# Patient Record
Sex: FEMALE | Race: WHITE | Employment: FULL TIME | URBAN - METROPOLITAN AREA
[De-identification: names, ages, dates, MRNs, and addresses within clinical notes are randomized per-mention and may not be internally consistent; named-entity substitution may affect disease eponyms.]

---

## 2018-08-29 LAB
CHOLESTEROL, TOTAL: 160 MG/DL
CHOLESTEROL/HDL RATIO: NORMAL
HDLC SERPL-MCNC: 53 MG/DL (ref 35–70)
LDL CHOLESTEROL CALCULATED: 94 MG/DL (ref 0–160)
TRIGL SERPL-MCNC: 66 MG/DL
VLDLC SERPL CALC-MCNC: 13 MG/DL

## 2019-05-01 RX ORDER — PROPRANOLOL HYDROCHLORIDE 80 MG/1
80 CAPSULE, EXTENDED RELEASE ORAL DAILY
COMMUNITY
End: 2019-05-05 | Stop reason: SDUPTHER

## 2019-05-08 ENCOUNTER — OFFICE VISIT (OUTPATIENT)
Dept: FAMILY MEDICINE CLINIC | Age: 54
End: 2019-05-08
Payer: COMMERCIAL

## 2019-05-08 VITALS
DIASTOLIC BLOOD PRESSURE: 68 MMHG | SYSTOLIC BLOOD PRESSURE: 118 MMHG | HEIGHT: 65 IN | WEIGHT: 153.2 LBS | BODY MASS INDEX: 25.52 KG/M2 | HEART RATE: 77 BPM | OXYGEN SATURATION: 97 %

## 2019-05-08 DIAGNOSIS — I34.1 MITRAL VALVE PROLAPSE: Primary | ICD-10-CM

## 2019-05-08 DIAGNOSIS — R00.2 PALPITATIONS: ICD-10-CM

## 2019-05-08 PROBLEM — L03.116 CELLULITIS OF LEFT LEG: Status: ACTIVE | Noted: 2018-01-02

## 2019-05-08 PROBLEM — S81.002A OPEN WOUND OF LEFT KNEE: Status: ACTIVE | Noted: 2018-02-08

## 2019-05-08 PROCEDURE — 99213 OFFICE O/P EST LOW 20 MIN: CPT | Performed by: FAMILY MEDICINE

## 2019-05-08 RX ORDER — PROPRANOLOL HYDROCHLORIDE 80 MG/1
CAPSULE, EXTENDED RELEASE ORAL
Qty: 30 CAPSULE | Refills: 5 | Status: SHIPPED | OUTPATIENT
Start: 2019-05-08 | End: 2019-11-13 | Stop reason: SDUPTHER

## 2019-05-08 ASSESSMENT — PATIENT HEALTH QUESTIONNAIRE - PHQ9
SUM OF ALL RESPONSES TO PHQ QUESTIONS 1-9: 0
SUM OF ALL RESPONSES TO PHQ QUESTIONS 1-9: 0
2. FEELING DOWN, DEPRESSED OR HOPELESS: 0
SUM OF ALL RESPONSES TO PHQ9 QUESTIONS 1 & 2: 0
1. LITTLE INTEREST OR PLEASURE IN DOING THINGS: 0

## 2019-05-08 ASSESSMENT — ENCOUNTER SYMPTOMS
SHORTNESS OF BREATH: 0
ABDOMINAL PAIN: 0
COUGH: 0

## 2019-05-08 NOTE — PROGRESS NOTES
Worry: None     Inability: None    Transportation needs:     Medical: None     Non-medical: None   Tobacco Use    Smoking status: Never Smoker    Smokeless tobacco: Never Used   Substance and Sexual Activity    Alcohol use: None    Drug use: None    Sexual activity: None   Lifestyle    Physical activity:     Days per week: None     Minutes per session: None    Stress: None   Relationships    Social connections:     Talks on phone: None     Gets together: None     Attends Rastafarian service: None     Active member of club or organization: None     Attends meetings of clubs or organizations: None     Relationship status: None    Intimate partner violence:     Fear of current or ex partner: None     Emotionally abused: None     Physically abused: None     Forced sexual activity: None   Other Topics Concern    None   Social History Narrative    None        SURGICAL HISTORY  Past Surgical History:   Procedure Laterality Date    CHOLECYSTECTOMY         CURRENT MEDICATIONS  Current Outpatient Medications   Medication Sig Dispense Refill    propranolol (INDERAL LA) 80 MG extended release capsule Take 1 tablet by mouth daily 30 capsule 5     No current facility-administered medications for this visit. ALLERGIES  No Known Allergies    PHYSICAL EXAM    Physical Exam   Constitutional: She is oriented to person, place, and time. She appears well-developed and well-nourished. No distress. HENT:   Head: Normocephalic and atraumatic. Right Ear: External ear normal.   Left Ear: External ear normal.   Cardiovascular: Normal rate and regular rhythm. Pulmonary/Chest: Effort normal and breath sounds normal.   Pt speaking in full sentences and lungs are CTA bilaterally. Neurological: She is alert and oriented to person, place, and time. Skin: Skin is warm and dry. She is not diaphoretic. Psychiatric: She has a normal mood and affect.  Her behavior is normal. Judgment and thought content normal. ASSESSMENT & PLAN    1. Mitral valve prolapse  Patient to continue taking propranolol 80 mg, 1 tablet by mouth daily. His monitor her symptoms and let us know should she have any other issues. Symptoms otherwise follow-up in 6 months. 2. Palpitations  Patient to continue taking propranolol 80 mg, 1 tablet by mouth daily. His monitor her symptoms and let us know should she have any other issues. Symptoms otherwise follow-up in 6 months. Did discuss with patient the need to either complete a colonoscopy or cold guard as she is past due for these health maintenance screenings. Patient is setting up an appointment with Dr. Wayne Harrison for her yearly well woman exam and will have another mammogram completed late this year, as her last mammogram was completed in October 2018. Patient did state that we were able to contact [place] [phone] for her mammogram results from last year. Patient verbalized understanding of and agreement with current POC for the assessment and plan dictated above. Electronically signed by KALEY Gautam on 5/8/2019      Please note that this chart was generated using dragon dictation software. Although every effort was made to ensure the accuracy of this automated transcription, some errors in transcription may have occurred.

## 2019-05-08 NOTE — PATIENT INSTRUCTIONS
Please keep taking medications as prescribed. Follow up with this office in 6 months. Please have Dr. Becca Gan send us results of your yearly screenings including mammograms and pap smears. Patient Education        propranolol  Pronunciation:  pro PRAJM oh lol  Brand:  Hemangeol, Inderal LA, Inderal XL, InnoPran XL  What is the most important information I should know about propranolol? You should not use this medicine if you have asthma, very slow heart beats, or a serious heart condition such as \"sick sinus syndrome\" or \"AV block\" (unless you have a pacemaker). Babies who weigh less than 4.5 pounds should not be given Hemangeol oral liquid. What is propranolol? Propranolol is a beta-blocker. Beta-blockers affect the heart and circulation (blood flow through arteries and veins). Propranolol is used to treat tremors, angina (chest pain), hypertension (high blood pressure), heart rhythm disorders, and other heart or circulatory conditions. It is also used to treat or prevent heart attack, and to reduce the severity and frequency of migraine headaches. Hemangeol (propranolol oral liquid 4.28 milligrams) is given to infants who are at least 11weeks old to treat a genetic condition called infantile hemangiomas. Hemangiomas are caused by blood vessels grouping together in an abnormal way. These blood vessels form benign (non-cancerous) growths that can develop into ulcers or red marks on the skin. Hemangiomas can also cause more serious complications inside the body (in the liver, brain, or digestive system). Propranolol may also be used for purposes not listed in this medication guide. What should I discuss with my healthcare provider before taking propranolol?   You should not use propranolol if you are allergic to it, or if you have:  · asthma;  · very slow heart beats that have caused you to faint; or  · a serious heart condition such as \"sick sinus syndrome\" or \"AV block\" (unless you have a pacemaker). Babies who weigh less than 4.5 pounds should not be given Hemangeol oral liquid. To make sure propranolol is safe for you, tell your doctor if you have:  · a muscle disorder;  · bronchitis, emphysema, or other breathing disorders;  · low blood sugar, or diabetes (propranolol can make it harder for you to tell when you have low blood sugar);  · slow heartbeats, low blood pressure;  · congestive heart failure;  · depression;  · liver or kidney disease;  · a thyroid disorder;  · pheochromocytoma (tumor of the adrenal gland); or  · problems with circulation (such as Raynaud's syndrome). It is not known whether propranolol will harm an unborn baby. Tell your doctor if you are pregnant or plan to become pregnant while using this medicine. Propranolol can pass into breast milk and may harm a nursing baby. Tell your doctor if you are breast-feeding a baby. How should I take propranolol? Follow all directions on your prescription label. Your doctor may occasionally change your dose to make sure you get the best results. Do not take this medicine in larger or smaller amounts or for longer than recommended. Adults may take propranolol with or without food, but take it the same way each time. Take this medicine at the same time each day. Do not crush, chew, break, or open an extended-release capsule. Swallow it whole. Hemangeol must be given to an infant during or just after a feeding. Doses should be spaced at least 9 hours apart. Make sure your child gets fed regularly while taking this medicine. Tell your doctor when the child has any changes in weight. Hemangeol doses are based on weight in children, and any changes may affect your child's dose. Call your doctor if a child taking Hemangeol is sick with vomiting, or has any loss of appetite. Measure liquid medicine with the dosing syringe provided, or with a special dose-measuring spoon or medicine cup.  If you do not have a dose-measuring device, ask your pharmacist for one. Do not shake Hemangeol liquid. Your blood pressure will need to be checked often. If you need surgery, tell the surgeon ahead of time that you are using propranolol. You may need to stop using the medicine for a short time. Do not skip doses or stop using propranolol suddenly. Stopping suddenly may make your condition worse. Follow your doctor's instructions about tapering your dose. This medicine can cause unusual results with certain medical tests. Tell any doctor who treats you that you are using propranolol. If you are being treated for high blood pressure, keep using this medicine even if you feel well. High blood pressure often has no symptoms. You may need to use blood pressure medicine for the rest of your life. Propranolol is only part of a complete program of treatment for hypertension that may also include diet, exercise, and weight control. Follow your diet, medication, and exercise routines very closely if you are being treated for hypertension. Store at room temperature away from moisture and heat. Do not allow liquid medicine to freeze. Throw away any unused Hemangeol 2 months after you first opened the bottle. What happens if I miss a dose? For regular (short-acting) propranolol: Take the missed dose as soon as you remember. Skip the missed dose if your next dose is less than 4 hours away. For extended-release propranolol (Inderal LA, InnoPran XL and others): Take the missed dose as soon as you remember. Skip the missed dose if your next dose is less than 8 hours away. Do not take extra medicine to make up the missed dose. What happens if I overdose? Seek emergency medical attention or call the Poison Help line at 1-934.580.4620. Overdose symptoms may include slow or uneven heartbeats, dizziness, weakness, or fainting. What should I avoid while taking propranolol? Avoid drinking alcohol. It may increase your blood levels of propranolol.   Avoid getting up too fast from a sitting or lying position, or you may feel dizzy. Get up slowly and steady yourself to prevent a fall. What are the possible side effects of propranolol? Get emergency medical help if you have signs of an allergic reaction: hives; difficult breathing; swelling of your face, lips, tongue, or throat. Call your doctor at once if you have:  · slow or uneven heartbeats;  · a light-headed feeling, like you might pass out;  · wheezing or trouble breathing;  · shortness of breath (even with mild exertion), swelling, rapid weight gain;  · sudden weakness, vision problems, or loss of coordination (especially in a child with hemangioma that affects the face or head);  · cold feeling in your hands and feet;  · depression, confusion, hallucinations;  · liver problems --nausea, upper stomach pain, itching, tired feeling, loss of appetite, dark urine, olivia-colored stools, jaundice (yellowing of the skin or eyes);  · low blood sugar --headache, hunger, weakness, sweating, confusion, irritability, dizziness, fast heart rate, or feeling jittery;  · low blood sugar in a baby --pale skin, blue or purple skin, sweating, fussiness, crying, not wanting to eat, feeling cold, drowsiness, weak or shallow breathing (breathing may stop for short periods), seizure (convulsions), or loss of consciousness; or  · severe skin reaction --fever, sore throat, swelling in your face or tongue, burning in your eyes, skin pain, followed by a red or purple skin rash that spreads (especially in the face or upper body) and causes blistering and peeling. Common side effects may include:  · nausea, vomiting, diarrhea, constipation, stomach cramps;  · decreased sex drive, impotence, or difficulty having an orgasm;  · sleep problems (insomnia); or  · tired feeling. This is not a complete list of side effects and others may occur. Call your doctor for medical advice about side effects. You may report side effects to FDA at 0-512-FDA-5671.   What other drugs will affect propranolol? Tell your doctor about all medicines you use, and those you start or stop using during your treatment with propranolol, especially:  · a blood thinner --warfarin, Coumadin, Jantoven;  · an antidepressant --amitriptyline, clomipramine, desipramine, imipramine, and others;  · drugs to treat high blood pressure or a prostate disorder --doxazosin, prazosin, terazosin;  · heart or blood pressure medicine --amiodarone, diltiazem, propafenone, quinidine, verapamil, and others;  · NSAIDs (nonsteroidal anti-inflammatory drugs) --aspirin, ibuprofen (Advil, Motrin), naproxen (Aleve), celecoxib, diclofenac, indomethacin, meloxicam, and others; or  · steroid medicine --prednisone and others. This list is not complete. Other drugs may interact with propranolol, including prescription and over-the-counter medicines, vitamins, and herbal products. Not all possible interactions are listed in this medication guide. Where can I get more information? Your pharmacist can provide more information about propranolol. Remember, keep this and all other medicines out of the reach of children, never share your medicines with others, and use this medication only for the indication prescribed. Every effort has been made to ensure that the information provided by [address] is accurate, up-to-date, and complete, but no guarantee is made to that effect. Drug information contained herein may be time sensitive. [facility] information has been compiled for use by healthcare practitioners and consumers in the [facility] and therefore [facility] does not warrant that uses outside of the [facility] are appropriate, unless specifically indicated otherwise. [facility]'s drug information does not endorse drugs, diagnose patients or recommend therapy.  [facility]'s drug information is an informational resource designed to assist licensed healthcare practitioners in caring for their patients and/or to serve consumers viewing this service as a supplement to, and not a substitute for, the expertise, skill, knowledge and judgment of healthcare practitioners. The absence of a warning for a given drug or drug combination in no way should be construed to indicate that the drug or drug combination is safe, effective or appropriate for any given patient. [facility] does not assume any responsibility for any aspect of healthcare administered with the aid of information [facility] provides. The information contained herein is not intended to cover all possible uses, directions, precautions, warnings, drug interactions, allergic reactions, or adverse effects. If you have questions about the drugs you are taking, check with your doctor, nurse or pharmacist.  Copyright 9353-3867 [address]: 12.01. Revision date: 8/22/2016. Care instructions adapted under license by [facility] ([facility]). If you have questions about a medical condition or this instruction, always ask your healthcare professional. [place] [address] any warranty or liability for your use of this information. Patient Education        Learning About Colonoscopy  What is a colonoscopy? A colonoscopy is a test (also called a procedure) that lets a doctor look inside your large intestine. The doctor uses a thin, lighted tube called a colonoscope. The doctor uses it to look for small growths called polyps, colon or rectal cancer (colorectal cancer), or other problems like bleeding. During the procedure, the doctor can take samples of tissue. The samples can then be checked for cancer or other conditions. The doctor can also take out polyps. How is colonoscopy done? This procedure is done in a doctor's office or a clinic or hospital. You will get medicine to help you relax and not feel pain. Some people find that they do not remember having the test because of the medicine. The doctor gently moves the colonoscope, or scope, through the colon. The scope is also a small video camera. It lets the doctor see the colon and take pictures. A colonoscopy usually takes 30 to 45 minutes. It may take longer if the doctor has to remove polyps. How do you prepare for the procedure? You need to clean out your colon before the procedure so the doctor can see all of your colon. You may start the cleaning process a day or two before the test. This depends on which \"colon prep\" your doctor recommends. To clean your colon, you stop eating solid foods and drink only clear liquids. You can have water, tea, coffee, clear juices, clear broths, flavored ice pops, and gelatin (such as Jell-O). Do not drink anything red or purple, such as grape juice or fruit punch. And do not eat red or purple foods, such as grape ice pops or cherry gelatin. The day or night before the procedure, you drink a large amount of a special liquid. This causes loose, frequent stools. You will go to the bathroom a lot. It is very important to drink all of the colon prep liquid. If you have problems drinking the liquid, call your doctor. For many people, the prep is worse than the test. It may be uncomfortable, and you may feel hungry on the clear liquid diet. Some people do not go to work or do their usual activities on the day of the prep. Arrange to have someone take you home after the test.  What can you expect after a colonoscopy? The nurses will watch you for 1 to 2 hours until the medicines wear off. Then you can go home. You will need a ride. Your doctor will tell you when you can eat and do your usual activities. Your doctor will talk to you about when you will need your next colonoscopy. The results of your test and your risk for colorectal cancer will help your doctor decide how often you need to be checked. Follow-up care is a key part of your treatment and safety. Be sure to make and go to all appointments, and call your doctor if you are having problems.  It's also a good idea to know your test results and keep a list of the medicines you take. Where can you learn more? Go to [email or web address] and sign in to your MedPro account. Enter U509 in the [facility] box to learn more about \"Learning About Colonoscopy. \"     If you do not have an account, please click on the \"Sign Up Now\" link. Current as of: December 19, 2018  Content Version: 12.0  © 7323-7534 Healthwise, [employer]. Care instructions adapted under license by [facility] ([facility]). If you have questions about a medical condition or this instruction, always ask your healthcare professional. [address] any warranty or liability for your use of this information. Patient Education   Patient Education        Colon Cancer Screening: Care Instructions  Your Care Instructions    Colorectal cancer occurs in the colon or rectum. That's the lower part of your digestive system. It is the second-leading cause of cancer deaths in the [place]. It often starts with small growths called polyps in the colon or rectum. Polyps are usually found with screening tests. Depending on the type of test, any polyps found may be removed during the tests. Colorectal cancer usually does not cause symptoms at first. But regular tests can help find it early, before it spreads and becomes harder to treat. Your risk for colorectal cancer gets higher as you get older. Some experts say that adults should start regular screening at age 48 and stop at age 76. Others say to start before age 48 or continue after age 76. Talk with your doctor about your risk and when to start and stop screening. You may have one of several tests. Follow-up care is a key part of your treatment and safety. Be sure to make and go to all appointments, and call your doctor if you are having problems. It's also a good idea to know your test results and keep a list of the medicines you take.   What are the main screening tests for colon cancer? · Stool tests. These include the fecal immunochemical test (FIT) and the fecal occult blood test (FOBT). These tests check stool samples for signs of cancer. If your test is positive, you will need to have a colonoscopy. · Sigmoidoscopy. This test lets your doctor look at the lining of your rectum and the lowest part of your colon. Your doctor uses a lighted tube called a sigmoidoscope. This test can't find cancers or polyps in the upper part of your colon. In some cases, polyps that are found can be removed. But if your doctor finds polyps, you will need to have a colonoscopy to check the upper part of your colon. · Colonoscopy. This test lets your doctor look at the lining of your rectum and your entire colon. The doctor uses a thin, flexible tool called a colonoscope. It can also be used to remove polyps or get a tissue sample (biopsy). What tests do you need? The following guidelines are for adults who are not at high risk for colorectal cancer. You may have at least one of these tests as directed by your doctor. · Fecal immunochemical test (FIT) or guaiac fecal occult blood test (gFOBT) every year  · Sigmoidoscopy every 5 years  · Colonoscopy every 10 years  If you are over age 76, you can work with your doctor to decide if screening is a good option. Talk with your doctor about when you need to be tested. And discuss which tests are right for you. Your doctor may recommend earlier or more frequent testing if you:  · Have had colorectal cancer before. · Have had colon polyps. · Have symptoms of colorectal cancer. These include blood in your stool and changes in your bowel habits. · Have a parent, brother or sister, or child with colon polyps or colorectal cancer. · Have a bowel disease. This includes ulcerative colitis and Crohn's disease. · Have a rare polyp syndrome that runs in families, such as familial adenomatous polyposis (FAP).   · Have had radiation treatments to the belly or pelvis. When should you call for help? Watch closely for changes in your health, and be sure to contact your doctor if:    · You have any changes in your bowel habits.     · You have any problems. Where can you learn more? Go to [email or web address] and sign in to your [employer] account. Enter [phone] in the [facility] box to learn more about \"Colon Cancer Screening: Care Instructions. \"     If you do not have an account, please click on the \"Sign Up Now\" link. Current as of: December 19, 2018  Content Version: 12.0  © 4565-0623 Healthwise, Incorporated. Care instructions adapted under license by [facility] ([facility]). If you have questions about a medical condition or this instruction, always ask your healthcare professional. [address] any warranty or liability for your use of this information.

## 2019-11-13 ENCOUNTER — OFFICE VISIT (OUTPATIENT)
Dept: FAMILY MEDICINE CLINIC | Age: 54
End: 2019-11-13
Payer: COMMERCIAL

## 2019-11-13 VITALS
SYSTOLIC BLOOD PRESSURE: 122 MMHG | HEIGHT: 65 IN | WEIGHT: 150.6 LBS | HEART RATE: 72 BPM | BODY MASS INDEX: 25.09 KG/M2 | DIASTOLIC BLOOD PRESSURE: 84 MMHG

## 2019-11-13 DIAGNOSIS — R21 RASH: Primary | ICD-10-CM

## 2019-11-13 DIAGNOSIS — I34.1 MITRAL VALVE PROLAPSE: ICD-10-CM

## 2019-11-13 PROCEDURE — 99213 OFFICE O/P EST LOW 20 MIN: CPT | Performed by: FAMILY MEDICINE

## 2019-11-13 RX ORDER — CLOTRIMAZOLE AND BETAMETHASONE DIPROPIONATE 10; .64 MG/G; MG/G
CREAM TOPICAL
Qty: 1 TUBE | Refills: 1 | Status: SHIPPED | OUTPATIENT
Start: 2019-11-13 | End: 2020-05-13

## 2019-11-13 RX ORDER — MULTIVIT-MIN/IRON/FOLIC ACID/K 18-600-40
1 CAPSULE ORAL DAILY
COMMUNITY

## 2019-11-13 RX ORDER — ASPIRIN 81 MG/1
81 TABLET, CHEWABLE ORAL DAILY
COMMUNITY

## 2019-11-13 RX ORDER — DOXYCYCLINE HYCLATE 100 MG
100 TABLET ORAL 2 TIMES DAILY
Qty: 14 TABLET | Refills: 0 | Status: SHIPPED | OUTPATIENT
Start: 2019-11-13 | End: 2019-11-20

## 2019-11-13 RX ORDER — PROPRANOLOL HYDROCHLORIDE 80 MG/1
CAPSULE, EXTENDED RELEASE ORAL
Qty: 90 CAPSULE | Refills: 1 | Status: SHIPPED | OUTPATIENT
Start: 2019-11-13 | End: 2020-05-13 | Stop reason: SDUPTHER

## 2019-11-13 ASSESSMENT — ENCOUNTER SYMPTOMS
EYES NEGATIVE: 1
RESPIRATORY NEGATIVE: 1
GASTROINTESTINAL NEGATIVE: 1

## 2020-05-13 ENCOUNTER — TELEMEDICINE (OUTPATIENT)
Dept: FAMILY MEDICINE CLINIC | Age: 55
End: 2020-05-13
Payer: COMMERCIAL

## 2020-05-13 VITALS — WEIGHT: 160 LBS | BODY MASS INDEX: 26.63 KG/M2

## 2020-05-13 PROCEDURE — 99213 OFFICE O/P EST LOW 20 MIN: CPT | Performed by: FAMILY MEDICINE

## 2020-05-13 RX ORDER — PROPRANOLOL HYDROCHLORIDE 80 MG/1
CAPSULE, EXTENDED RELEASE ORAL
Qty: 90 CAPSULE | Refills: 1 | Status: SHIPPED | OUTPATIENT
Start: 2020-05-13 | End: 2020-11-11 | Stop reason: SDUPTHER

## 2020-05-13 ASSESSMENT — ENCOUNTER SYMPTOMS
WHEEZING: 0
SHORTNESS OF BREATH: 0
VOMITING: 0
TROUBLE SWALLOWING: 0
BLOOD IN STOOL: 0
EYE PAIN: 0
DIARRHEA: 0
CHEST TIGHTNESS: 0
ABDOMINAL PAIN: 0
NAUSEA: 0

## 2020-05-13 ASSESSMENT — PATIENT HEALTH QUESTIONNAIRE - PHQ9
SUM OF ALL RESPONSES TO PHQ QUESTIONS 1-9: 0
SUM OF ALL RESPONSES TO PHQ9 QUESTIONS 1 & 2: 0
1. LITTLE INTEREST OR PLEASURE IN DOING THINGS: 0
2. FEELING DOWN, DEPRESSED OR HOPELESS: 0
SUM OF ALL RESPONSES TO PHQ QUESTIONS 1-9: 0

## 2020-05-13 NOTE — PROGRESS NOTES
extended release capsule Take 1 tablet by mouth daily Yes Nicho Aguirre MD   aspirin 81 MG chewable tablet Take 81 mg by mouth daily Yes Historical Provider, MD   Cholecalciferol (VITAMIN D) 50 MCG (2000 UT) CAPS capsule Take 1 capsule by mouth daily Yes Historical Provider, MD       Social History     Tobacco Use    Smoking status: Never Smoker    Smokeless tobacco: Never Used   Substance Use Topics    Alcohol use: Not on file    Drug use: Not on file            PHYSICAL EXAMINATION:  [ INSTRUCTIONS:  \"[x]\" Indicates a positive item  \"[]\" Indicates a negative item  -- DELETE ALL ITEMS NOT EXAMINED]  Vital Signs: (As obtained by patient/caregiver or practitioner observation)    Blood pressure-  Heart rate-    Respiratory rate-    Temperature-  Pulse oximetry-     Constitutional: [] Appears well-developed and well-nourished [x] No apparent distress      [] Abnormal-   Mental status  [x] Alert and awake  [x] Oriented to person/place/time [x]Able to follow commands      Eyes:  EOM    [x]  Normal  [] Abnormal-  Sclera  []  Normal  [] Abnormal -         Discharge []  None visible  [] Abnormal -    HENT:   [x] Normocephalic, atraumatic.   [] Abnormal   [x] Mouth/Throat: Mucous membranes are moist.     External Ears [] Normal  [] Abnormal-     Neck: [] No visualized mass     Pulmonary/Chest: [x] Respiratory effort normal.  [x] No visualized signs of difficulty breathing or respiratory distress        [] Abnormal-      Musculoskeletal:   [x] Normal gait with no signs of ataxia         [x] Normal range of motion of neck        [] Abnormal-       Neurological:        [x] No Facial Asymmetry (Cranial nerve 7 motor function) (limited exam to video visit)          [x] No gaze palsy        [] Abnormal-         Skin:        [x] No significant exanthematous lesions or discoloration noted on facial skin         [] Abnormal-            Psychiatric:       [x] Normal Affect [] No Hallucinations        [] Abnormal-     Other

## 2020-07-27 ENCOUNTER — OFFICE VISIT (OUTPATIENT)
Dept: PRIMARY CARE CLINIC | Age: 55
End: 2020-07-27
Payer: COMMERCIAL

## 2020-07-27 ENCOUNTER — HOSPITAL ENCOUNTER (OUTPATIENT)
Age: 55
Setting detail: SPECIMEN
Discharge: HOME | End: 2020-07-27
Payer: COMMERCIAL

## 2020-07-27 VITALS — TEMPERATURE: 97.3 F

## 2020-07-27 PROCEDURE — 99213 OFFICE O/P EST LOW 20 MIN: CPT | Performed by: NURSE PRACTITIONER

## 2020-07-27 PROCEDURE — U0002 COVID-19 LAB TEST NON-CDC: HCPCS

## 2020-07-27 NOTE — PROGRESS NOTES
7/27/20  Lianne Hastings  1965    FLU/COVID-19 CLINIC EVALUATION    HPI SYMPTOMS:    Employer: [employer]    [x] Fevers  [] Chills  [x] Cough  [] Coughing up blood  [] Chest Congestion  [x] Nasal Congestion  [] Feeling short of breath  [] Sometimes  [] Frequently  [] All the time  [] Chest pain  [] Headaches  []Tolerable  [] Severe  [] Sore throat  [] Muscle aches  [] Nausea  [] Vomiting  []Unable to keep fluids down  [] Diarrhea  []Severe    [] OTHER SYMPTOMS:      Symptom Duration:   [] 1  [x] 2   [] 3   [] 4    [] 5   [] 6   [] 7   [] 8   [] 9   [] 10   [] 11   [] 12   [] 13   [] 14   [] Longer than 14 days    Symptom course:   [x] Worsening     [] Stable     [] Improving    RISK FACTORS:    [] Pregnant or possibly pregnant  [] Age over 61  [] Diabetes  [] Heart disease  [] Asthma  [] COPD/Other chronic lung diseases  [] Active Cancer  [] On Chemotherapy  [] Taking oral steroids  [] History Lymphoma/Leukemia  [] Close contact with a lab confirmed COVID-19 patient within 14 days of symptom onset  [] History of travel from affected geographical areas within 14 days of symptom onset       VITALS:  There were no vitals filed for this visit. TESTS:    POCT FLU:  [] Positive     []Negative    ASSESSMENT:    [] Flu  [] Possible COVID-19  [] Strep    PLAN:    [] Discharge home with written instructions for:  [] Flu management  [] Possible COVID-19 infection with self-quarantine and management of symptoms  [] Follow-up with primary care physician or emergency department if worsens  [] Evaluation per physician/nurse practitioner in clinic  [] Sent to ER       An  electronic signature was used to authenticate this note.      --Buddy Gustafson MA on 7/27/2020 at 9:30 AM
discomfort. If symptoms are worse -Go to the ER. Follow up with your primary doctor    To Whom it May Concern:    Gagandeep Gillespie has been tested for COVID on 07/27/20. They may NOT return to work until their lab test results back and they been fever free for 3 days. If test is positive they must stay home for 2 weeks or until they test negative or as directed by the [facility] Department. - COVID-19 Ambulatory      FOLLOW-UP:  No follow-ups on file.     In addition to other information, the printed after visit summary provided to the patient includes:  [x] COVID-19 Self care instructions  [x] COVID-19 General patient information

## 2020-07-27 NOTE — PATIENT INSTRUCTIONS
Your COVID 19 test can take 7-10 days for the results come back. We ask that you make a Mychart page and view your test results this way. You will need to Self quarantine until you know your results. Increase fluids rest  Saline nasal spray as directed  Warm salt gargles for throat discomfort  Monitor temperature twice a day  Tylenol for fevers and/or discomfort. If symptoms are worse -Go to the ER. Follow up with your primary doctor    To Whom it May Concern:    Joanna Nava has been tested for COVID on 07/27/20. They may NOT return to work until their lab test results back and they been fever free for 3 days. If test is positive they must stay home for 2 weeks or until they test negative or as directed by the [facility] Department.

## 2020-07-28 LAB
SARS-COV-2: NOT DETECTED
SOURCE: NORMAL

## 2020-11-11 ENCOUNTER — TELEMEDICINE (OUTPATIENT)
Dept: FAMILY MEDICINE CLINIC | Age: 55
End: 2020-11-11
Payer: COMMERCIAL

## 2020-11-11 PROCEDURE — 99213 OFFICE O/P EST LOW 20 MIN: CPT | Performed by: FAMILY MEDICINE

## 2020-11-11 RX ORDER — PROPRANOLOL HYDROCHLORIDE 80 MG/1
CAPSULE, EXTENDED RELEASE ORAL
Qty: 90 CAPSULE | Refills: 1 | Status: SHIPPED | OUTPATIENT
Start: 2020-11-11 | End: 2021-05-12 | Stop reason: SDUPTHER

## 2020-11-11 ASSESSMENT — ENCOUNTER SYMPTOMS
ABDOMINAL PAIN: 0
DIARRHEA: 0
TROUBLE SWALLOWING: 0
VOMITING: 0
WHEEZING: 0
NAUSEA: 0
BLOOD IN STOOL: 0
SHORTNESS OF BREATH: 0
EYE PAIN: 0
CHEST TIGHTNESS: 0

## 2020-11-11 NOTE — PROGRESS NOTES
Bambi Chakraborty MD   aspirin 81 MG chewable tablet Take 81 mg by mouth daily Yes Historical Provider, MD   Cholecalciferol (VITAMIN D) 50 MCG (2000 UT) CAPS capsule Take 1 capsule by mouth daily Yes Historical Provider, MD       Social History     Tobacco Use    Smoking status: Never Smoker    Smokeless tobacco: Never Used   Substance Use Topics    Alcohol use: Not on file    Drug use: Not on file            PHYSICAL EXAMINATION:  [ INSTRUCTIONS:  \"[x]\" Indicates a positive item  \"[]\" Indicates a negative item  -- DELETE ALL ITEMS NOT EXAMINED]  Vital Signs: (As obtained by patient/caregiver or practitioner observation)    Blood pressure-  Heart rate-    Respiratory rate-    Temperature-  Pulse oximetry-     Constitutional: [x] Appears well-developed and well-nourished [x] No apparent distress      [] Abnormal-   Mental status  [x] Alert and awake  [x] Oriented to person/place/time [x]Able to follow commands      Eyes:  EOM    [x]  Normal  [] Abnormal-  Sclera  [x]  Normal  [] Abnormal -         Discharge []  None visible  [] Abnormal -    HENT:   [x] Normocephalic, atraumatic.   [] Abnormal   [x] Mouth/Throat: Mucous membranes are moist.     External Ears [] Normal  [] Abnormal-     Neck: [] No visualized mass     Pulmonary/Chest: [x] Respiratory effort normal.  [] No visualized signs of difficulty breathing or respiratory distress        [] Abnormal-      Musculoskeletal:   [x] Normal gait with no signs of ataxia         [x] Normal range of motion of neck        [] Abnormal-       Neurological:        [x] No Facial Asymmetry (Cranial nerve 7 motor function) (limited exam to video visit)          [x] No gaze palsy        [] Abnormal-         Skin:        [x] No significant exanthematous lesions or discoloration noted on facial skin         [] Abnormal-            Psychiatric:       [x] Normal Affect [] No Hallucinations        [] Abnormal-     Other pertinent observable physical exam findings- discussion virtually to substitute for in-person clinic visit. Patient and provider were located at their individual homes. --Brodie Valle MD on 11/11/2020 at 12:51 PM    An electronic signature was used to authenticate this note.

## 2020-11-13 ENCOUNTER — TELEPHONE (OUTPATIENT)
Dept: BARIATRICS/WEIGHT MGMT | Age: 55
End: 2020-11-13

## 2020-12-02 ENCOUNTER — OFFICE VISIT (OUTPATIENT)
Dept: SURGERY | Age: 55
End: 2020-12-02

## 2020-12-02 VITALS
WEIGHT: 170.1 LBS | OXYGEN SATURATION: 98 % | SYSTOLIC BLOOD PRESSURE: 140 MMHG | HEIGHT: 66 IN | DIASTOLIC BLOOD PRESSURE: 86 MMHG | RESPIRATION RATE: 16 BRPM | HEART RATE: 77 BPM | TEMPERATURE: 99 F | BODY MASS INDEX: 27.34 KG/M2

## 2020-12-02 PROBLEM — Z12.11 SCREENING FOR COLON CANCER: Status: ACTIVE | Noted: 2020-12-02

## 2020-12-02 PROCEDURE — 99999 PR OFFICE/OUTPT VISIT,PROCEDURE ONLY: CPT | Performed by: SURGERY

## 2020-12-02 RX ORDER — SODIUM, POTASSIUM,MAG SULFATES 17.5-3.13G
SOLUTION, RECONSTITUTED, ORAL ORAL
Qty: 1 KIT | Refills: 0 | Status: SHIPPED
Start: 2020-12-02 | End: 2021-05-19

## 2020-12-02 RX ORDER — SODIUM CHLORIDE 0.9 % (FLUSH) 0.9 %
10 SYRINGE (ML) INJECTION EVERY 12 HOURS SCHEDULED
Status: DISCONTINUED | OUTPATIENT
Start: 2020-12-02 | End: 2021-12-08

## 2020-12-02 RX ORDER — SODIUM CHLORIDE, SODIUM LACTATE, POTASSIUM CHLORIDE, CALCIUM CHLORIDE 600; 310; 30; 20 MG/100ML; MG/100ML; MG/100ML; MG/100ML
INJECTION, SOLUTION INTRAVENOUS CONTINUOUS
Status: CANCELLED | OUTPATIENT
Start: 2020-12-02

## 2020-12-02 RX ORDER — SODIUM CHLORIDE 0.9 % (FLUSH) 0.9 %
10 SYRINGE (ML) INJECTION PRN
Status: DISCONTINUED | OUTPATIENT
Start: 2020-12-02 | End: 2021-12-08

## 2020-12-02 NOTE — PROGRESS NOTES
Chief Complaint   Patient presents with    New Patient     colon CA screening ref Dr Marylene Dimes:  HPI: Patient  presents today for evaluation and possible need of colonoscopy. Patient has not had colonoscopy in the past.      Patient has been experiencing the following symptoms:  none. The patient denies: melena, weight loss or blood in the stools. There is no family history of colon cancer. I have reviewed the patient's(pertinent information to this visit) medical history, family history(scanned in  the Media tab under \"patient questioner\"), social history and review ofsystems with the patient today in the office. Past Surgical History:   Procedure Laterality Date    CHOLECYSTECTOMY         No past medical history on file.     Family History   Problem Relation Age of Onset    Diabetes Paternal Grandmother        Social History     Socioeconomic History    Marital status:      Spouse name: Not on file    Number of children: Not on file    Years of education: Not on file    Highest education level: Not on file   Occupational History    Not on file   Social Needs    Financial resource strain: Not on file    Food insecurity     Worry: Not on file     Inability: Not on file   [employer] needs     Medical: Not on file     Non-medical: Not on file   Tobacco Use    Smoking status: Never Smoker    Smokeless tobacco: Never Used   Substance and Sexual Activity    Alcohol use: Not Currently    Drug use: Never    Sexual activity: Not on file   Lifestyle    Physical activity     Days per week: Not on file     Minutes per session: Not on file    Stress: Not on file   Relationships    Social connections     Talks on phone: Not on file     Gets together: Not on file     Attends Mu-ism service: Not on file     Active member of club or organization: Not on file     Attends meetings of clubs or organizations: Not on file     Relationship status: Not on file    Intimate partner violence     Fear of current or ex partner: Not on file     Emotionally abused: Not on file     Physically abused: Not on file     Forced sexual activity: Not on file   Other Topics Concern    Not on file   Social History Narrative    Not on file       Current Outpatient Medications   Medication Sig Dispense Refill    Na Sulfate-K Sulfate-Mg Sulf 17.5-3.13-1.6 GM/177ML SOLN Follow directions given to patient in the office 1 kit 0    propranolol (INDERAL LA) 80 MG extended release capsule Take 1 tablet by mouth daily 90 capsule 1    aspirin 81 MG chewable tablet Take 81 mg by mouth daily      Cholecalciferol (VITAMIN D) 50 MCG (2000 UT) CAPS capsule Take 1 capsule by mouth daily       Current Facility-Administered Medications   Medication Dose Route Frequency Provider Last Rate Last Dose    sodium chloride flush 0.9 % injection 10 mL  10 mL Intravenous 2 times per day Carlyon Denver, MD        sodium chloride flush 0.9 % injection 10 mL  10 mL Intravenous PRN Carlyon Denver, MD            No Known Allergies    Review of Systems:       Review of Systems   Constitutional: Negative for chills. Negative for fever. HENT: Negative for congestion. Negative for rhinorrhea. Respiratory: Negative for cough. Negative for shortness of breath. Negative for wheezing. Cardiovascular: Negative for chest pain. Gastrointestinal:  Negative for constipation. Negative for diarrhea. Negative for nausea and vomiting. Genitourinary: Negative for difficulty urinating. Neurological: Negative for dizziness, syncope and numbness. Hematological: Does not bruise/bleed easily.          OBJECTIVE:  Physical Exam:    BP (!) 140/86   Pulse 77   Temp 99 °F (37.2 °C) (Infrared)   Resp 16   Ht 5' 6\" (1.676 m)   Wt 170 lb 1.6 oz (77.2 kg)   SpO2 98%   BMI 27.45 kg/m²      Physical Exam  General: awake, alert, in no acute distress  HEENT: mucous membranes moist  Respiratory: normal effort, no wheezes appreciated  CV: appears well perfused, regular rate and rhythm  Abdomen: Soft, non-tender, non-distended. No guarding or rebound tenderness. Skin: warm and dry  Extremities: atraumatic  Neuro: no focal deficits noted  Psych: mood normal        ASSESSMENT:  1. Screening for colon cancer          PLAN:  Treatment:    -Will proceed with colonoscopy. -Reviewed in detail with the patient and/or family the expected pre-operative, operative, and post-operative courses including risks, benefits, and alternatives to the procedure. The patient's questions were answered in detail and agreed to proceed with the procedure.     -Bowel prep instructions / script provided and discussed with patient.      Orders Placed This Encounter   Procedures    Initiate PAT Protocol       Orders Placed This Encounter   Medications    sodium chloride flush 0.9 % injection 10 mL    sodium chloride flush 0.9 % injection 10 mL    Na Sulfate-K Sulfate-Mg Sulf 17.5-3.13-1.6 GM/177ML SOLN     Sig: Follow directions given to patient in the office     Dispense:  1 kit     Refill:  0          Solange Avlarado MD

## 2020-12-15 ENCOUNTER — TELEPHONE (OUTPATIENT)
Dept: SURGERY | Age: 55
End: 2020-12-15

## 2020-12-15 NOTE — TELEPHONE ENCOUNTER
LEFT MESSAGE FOR Lianne Hastings REGARDING SURGERY (Colonoscopy) SCHEDULED @ [facility].  NOTIFIED OF DATES, TIMES AND LOCATION    PHONE ASSESSMENT  SURGERY - 12/31/20 @ 9:00, 7:00 arrival  P/O - 01/07/21 at 11:00 Phone visit    NPO AFTER MIDNIGHT  Covid-19 Culture on 12/23/20 @ 9:15  HOLD BLOOD THINNERS - Does not need to hold ASA 81mg       Message left to call   SENT

## 2020-12-17 NOTE — TELEPHONE ENCOUNTER
Called Pt she stated she needed to cancel and reschedule, she has to babysit that day, she will call us back to reschedule

## 2021-01-01 PROBLEM — Z12.11 SCREENING FOR COLON CANCER: Status: RESOLVED | Noted: 2020-12-02 | Resolved: 2021-01-01

## 2021-05-12 ENCOUNTER — TELEMEDICINE (OUTPATIENT)
Dept: FAMILY MEDICINE CLINIC | Age: 56
End: 2021-05-12

## 2021-05-12 DIAGNOSIS — R00.2 PALPITATIONS: ICD-10-CM

## 2021-05-12 DIAGNOSIS — I34.1 MITRAL VALVE PROLAPSE: Primary | ICD-10-CM

## 2021-05-12 PROCEDURE — 99024 POSTOP FOLLOW-UP VISIT: CPT | Performed by: FAMILY MEDICINE

## 2021-05-12 RX ORDER — PROPRANOLOL HYDROCHLORIDE 80 MG/1
CAPSULE, EXTENDED RELEASE ORAL
Qty: 90 CAPSULE | Refills: 1 | Status: SHIPPED | OUTPATIENT
Start: 2021-05-12 | End: 2021-05-19 | Stop reason: SDUPTHER

## 2021-05-12 ASSESSMENT — PATIENT HEALTH QUESTIONNAIRE - PHQ9
1. LITTLE INTEREST OR PLEASURE IN DOING THINGS: 0
SUM OF ALL RESPONSES TO PHQ9 QUESTIONS 1 & 2: 0
SUM OF ALL RESPONSES TO PHQ QUESTIONS 1-9: 0

## 2021-05-19 ENCOUNTER — TELEMEDICINE (OUTPATIENT)
Dept: FAMILY MEDICINE CLINIC | Age: 56
End: 2021-05-19
Payer: COMMERCIAL

## 2021-05-19 DIAGNOSIS — R00.2 PALPITATIONS: ICD-10-CM

## 2021-05-19 DIAGNOSIS — I34.1 MITRAL VALVE PROLAPSE: ICD-10-CM

## 2021-05-19 DIAGNOSIS — Z00.00 WELL ADULT EXAM: Primary | ICD-10-CM

## 2021-05-19 PROBLEM — S81.002A OPEN WOUND OF LEFT KNEE: Status: RESOLVED | Noted: 2018-02-08 | Resolved: 2021-05-19

## 2021-05-19 PROBLEM — L03.116 CELLULITIS OF LEFT LEG: Status: RESOLVED | Noted: 2018-01-02 | Resolved: 2021-05-19

## 2021-05-19 PROCEDURE — 99396 PREV VISIT EST AGE 40-64: CPT | Performed by: PHYSICIAN ASSISTANT

## 2021-05-19 RX ORDER — PROPRANOLOL HYDROCHLORIDE 80 MG/1
CAPSULE, EXTENDED RELEASE ORAL
Qty: 90 CAPSULE | Refills: 1 | Status: SHIPPED | OUTPATIENT
Start: 2021-05-19 | End: 2021-11-17 | Stop reason: SDUPTHER

## 2021-05-19 ASSESSMENT — ENCOUNTER SYMPTOMS: SHORTNESS OF BREATH: 0

## 2021-05-19 NOTE — PROGRESS NOTES
Palpitations  Controlled on propranolol.  - CBC Auto Differential; Future  - TSH WITH REFLEX TO FT4; Future  - propranolol (INDERAL LA) 80 MG extended release capsule; Take 1 tablet by mouth daily  Dispense: 90 capsule; Refill: 1        No follow-ups on file. Cecilia Vega is a 54 y.o. female being evaluated by a Virtual Visit (video visit) encounter to address concerns as mentioned above. A caregiver was present when appropriate. Due to this being a TeleHealth encounter (During [place] [address] public health emergency), evaluation of the following organ systems was limited: Vitals/Constitutional/EENT/Resp/CV/GI//MS/Neuro/Skin/Heme-Lymph-Imm. Pursuant to the emergency declaration under the [address] [place] [address] authority and the [employer] and [employer] Act, this Virtual Visit was conducted with patient's (and/or legal guardian's) consent, to reduce the patient's risk of exposure to COVID-19 and provide necessary medical care. The patient (and/or legal guardian) has also been advised to contact this office for worsening conditions or problems, and seek emergency medical treatment and/or call 911 if deemed necessary. Services were provided through a video synchronous discussion virtually to substitute for in-person clinic visit. Patient and provider were located at their individual homes. --Rula Burroughs PA-C on 5/19/2021 at 8:29 AM    An electronic signature was used to authenticate this note.

## 2021-09-22 ENCOUNTER — HOSPITAL ENCOUNTER (OUTPATIENT)
Dept: WOMENS IMAGING | Age: 56
Discharge: HOME | End: 2021-09-22
Payer: COMMERCIAL

## 2021-09-22 DIAGNOSIS — Z12.31 ENCOUNTER FOR SCREENING MAMMOGRAM FOR MALIGNANT NEOPLASM OF BREAST: ICD-10-CM

## 2021-09-22 PROCEDURE — 77063 BREAST TOMOSYNTHESIS BI: CPT

## 2021-11-17 ENCOUNTER — OFFICE VISIT (OUTPATIENT)
Dept: FAMILY MEDICINE CLINIC | Age: 56
End: 2021-11-17
Payer: COMMERCIAL

## 2021-11-17 VITALS
HEART RATE: 79 BPM | HEIGHT: 66 IN | OXYGEN SATURATION: 97 % | WEIGHT: 170 LBS | DIASTOLIC BLOOD PRESSURE: 80 MMHG | SYSTOLIC BLOOD PRESSURE: 118 MMHG | BODY MASS INDEX: 27.32 KG/M2

## 2021-11-17 DIAGNOSIS — Z13.29 THYROID DISORDER SCREEN: ICD-10-CM

## 2021-11-17 DIAGNOSIS — R00.2 PALPITATIONS: ICD-10-CM

## 2021-11-17 DIAGNOSIS — Z13.220 LIPID SCREENING: ICD-10-CM

## 2021-11-17 DIAGNOSIS — Z00.00 WELL ADULT HEALTH CHECK: Primary | ICD-10-CM

## 2021-11-17 DIAGNOSIS — I34.1 MITRAL VALVE PROLAPSE: ICD-10-CM

## 2021-11-17 DIAGNOSIS — Z12.11 COLON CANCER SCREENING: ICD-10-CM

## 2021-11-17 DIAGNOSIS — Z00.00 WELL ADULT HEALTH CHECK: ICD-10-CM

## 2021-11-17 LAB
A/G RATIO: 1.7 (ref 1.1–2.2)
ALBUMIN SERPL-MCNC: 4.4 G/DL (ref 3.4–5)
ALP BLD-CCNC: 82 U/L (ref 40–129)
ALT SERPL-CCNC: 9 U/L (ref 10–40)
ANION GAP SERPL CALCULATED.3IONS-SCNC: 14 MMOL/L (ref 3–16)
AST SERPL-CCNC: 16 U/L (ref 15–37)
BILIRUB SERPL-MCNC: 0.9 MG/DL (ref 0–1)
BUN BLDV-MCNC: 16 MG/DL (ref 7–20)
CALCIUM SERPL-MCNC: 9.7 MG/DL (ref 8.3–10.6)
CHLORIDE BLD-SCNC: 103 MMOL/L (ref 99–110)
CHOLESTEROL, TOTAL: 188 MG/DL (ref 0–199)
CO2: 26 MMOL/L (ref 21–32)
CREAT SERPL-MCNC: 0.6 MG/DL (ref 0.6–1.1)
GFR AFRICAN AMERICAN: >60
GFR NON-AFRICAN AMERICAN: >60
GLUCOSE BLD-MCNC: 106 MG/DL (ref 70–99)
HCT VFR BLD CALC: 43 % (ref 36–48)
HDLC SERPL-MCNC: 67 MG/DL (ref 40–60)
HEMOGLOBIN: 13.9 G/DL (ref 12–16)
LDL CHOLESTEROL CALCULATED: 105 MG/DL
MCH RBC QN AUTO: 29.4 PG (ref 26–34)
MCHC RBC AUTO-ENTMCNC: 32.4 G/DL (ref 31–36)
MCV RBC AUTO: 90.7 FL (ref 80–100)
PDW BLD-RTO: 13.5 % (ref 12.4–15.4)
PLATELET # BLD: 249 K/UL (ref 135–450)
PMV BLD AUTO: 7.6 FL (ref 5–10.5)
POTASSIUM SERPL-SCNC: 4.2 MMOL/L (ref 3.5–5.1)
RBC # BLD: 4.74 M/UL (ref 4–5.2)
SODIUM BLD-SCNC: 143 MMOL/L (ref 136–145)
TOTAL PROTEIN: 7 G/DL (ref 6.4–8.2)
TRIGL SERPL-MCNC: 80 MG/DL (ref 0–150)
TSH REFLEX: 1.29 UIU/ML (ref 0.27–4.2)
VLDLC SERPL CALC-MCNC: 16 MG/DL
WBC # BLD: 4.6 K/UL (ref 4–11)

## 2021-11-17 PROCEDURE — 99396 PREV VISIT EST AGE 40-64: CPT | Performed by: FAMILY MEDICINE

## 2021-11-17 RX ORDER — PROPRANOLOL HYDROCHLORIDE 80 MG/1
CAPSULE, EXTENDED RELEASE ORAL
Qty: 90 CAPSULE | Refills: 1 | Status: SHIPPED | OUTPATIENT
Start: 2021-11-17 | End: 2022-05-17 | Stop reason: SDUPTHER

## 2021-11-17 RX ORDER — CALCIUM CARBONATE 500(1250)
500 TABLET ORAL DAILY
COMMUNITY

## 2021-11-17 ASSESSMENT — ENCOUNTER SYMPTOMS
WHEEZING: 0
NAUSEA: 0
SHORTNESS OF BREATH: 0
EYE PAIN: 0
DIARRHEA: 0
VOMITING: 0
TROUBLE SWALLOWING: 0
ABDOMINAL PAIN: 0
CHEST TIGHTNESS: 0
BLOOD IN STOOL: 0

## 2021-11-17 NOTE — PROGRESS NOTES
11/17/2021    Lianne Hastings    Chief Complaint   Patient presents with    6 Month Follow-Up       HPI  History was obtained from patient. Ela Cormier is a 64 y.o. female who presents today with follow-up for routine visit on palpitations mitral valve prolapse and general medical care. Patient denies increased palpitations or shortness of breath. No chest pain reported. On review she does still need to get screening colonoscopy and had to be rescheduled and then missed because of Covid. She has had Covid vaccine x2 and will need a flu shot. Patient will be getting this through work in near future. She admits to some exercise weight remains stable vital signs are good continues to work mood is reasonable. Last mammogram was early in 2021 that was satisfactory. She remains active with her grandkids. On review will need to get labs and will need to reschedule colonoscopy, and provide refills as needed. REVIEW OF SYMPTOMS    Review of Systems   Constitutional: Negative for activity change and fatigue. HENT: Negative for congestion, hearing loss, mouth sores and trouble swallowing. Eyes: Negative for pain and visual disturbance. Respiratory: Negative for chest tightness, shortness of breath and wheezing. Cardiovascular: Negative for chest pain and palpitations. Gastrointestinal: Negative for abdominal pain, blood in stool, diarrhea, nausea and vomiting. Endocrine: Negative for polydipsia and polyuria. Genitourinary: Negative for dysuria, frequency and urgency. Musculoskeletal: Negative for arthralgias, gait problem and neck stiffness. Skin: Negative for rash. Allergic/Immunologic: Negative for environmental allergies. Neurological: Negative for dizziness, seizures, speech difficulty and weakness. Hematological: Does not bruise/bleed easily. Psychiatric/Behavioral: Negative for agitation, confusion, hallucinations and suicidal ideas. The patient is not nervous/anxious.         PAST MEDICAL HISTORY  No past medical history on file. FAMILY HISTORY  Family History   Problem Relation Age of Onset    Diabetes Paternal Grandmother        SOCIAL HISTORY  Social History     Socioeconomic History    Marital status:      Spouse name: None    Number of children: None    Years of education: None    Highest education level: None   Occupational History    None   Tobacco Use    Smoking status: Never Smoker    Smokeless tobacco: Never Used   Substance and Sexual Activity    Alcohol use: Not Currently    Drug use: Never    Sexual activity: None   Other Topics Concern    None   Social History Narrative    None     Social Determinants of Health     Financial Resource Strain:     Difficulty of Paying Living Expenses: Not on file   Food Insecurity:     Worried About Running Out of Food in the Last Year: Not on file    Kavitha of Food in the Last Year: Not on file   Transportation Needs:     Lack of Transportation (Medical): Not on file    Lack of Transportation (Non-Medical):  Not on file   Physical Activity:     Days of Exercise per Week: Not on file    Minutes of Exercise per Session: Not on file   Stress:     Feeling of Stress : Not on file   Social Connections:     Frequency of Communication with Friends and Family: Not on file    Frequency of Social Gatherings with Friends and Family: Not on file    Attends Yarsanism Services: Not on file    Active Member of [address] [place] [address] [employer] or Organizations: Not on file    Attends Club or Organization Meetings: Not on file    Marital Status: Not on file   Intimate Partner Violence:     Fear of Current or Ex-Partner: Not on file    Emotionally Abused: Not on file    Physically Abused: Not on file    Sexually Abused: Not on file   Housing Stability:     Unable to Pay for Housing in the Last Year: Not on file    Number of [place] in the Last Year: Not on file    Unstable Housing in the Last Year: Not on file        SURGICAL HISTORY  Past Surgical History:   Procedure Laterality Date    CHOLECYSTECTOMY                   CURRENT MEDICATIONS  Current Outpatient Medications   Medication Sig Dispense Refill    calcium carbonate (OSCAL) 500 MG TABS tablet Take 500 mg by mouth daily      propranolol (INDERAL LA) 80 MG extended release capsule Take 1 tablet by mouth daily 90 capsule 1    aspirin 81 MG chewable tablet Take 81 mg by mouth daily      Cholecalciferol (VITAMIN D) 50 MCG (2000 UT) CAPS capsule Take 1 capsule by mouth daily       Current Facility-Administered Medications   Medication Dose Route Frequency Provider Last Rate Last Admin    sodium chloride flush 0.9 % injection 10 mL  10 mL IntraVENous 2 times per day Ainsley Bishop MD        sodium chloride flush 0.9 % injection 10 mL  10 mL IntraVENous PRN Ainsley Bishop MD           ALLERGIES  No Known Allergies    PHYSICAL EXAM    /80 (Site: Right Upper Arm, Position: Sitting, Cuff Size: Medium Adult)   Pulse 79   Ht 5' 6\" (1.676 m)   Wt 170 lb (77.1 kg)   SpO2 97%   BMI 27.44 kg/m²     Physical Exam  Vitals and nursing note reviewed. Constitutional:       General: She is not in acute distress. Appearance: She is well-developed. She is not ill-appearing or toxic-appearing. HENT:      Head: Normocephalic and atraumatic. Nose: Nose normal.      Mouth/Throat:      Mouth: Mucous membranes are moist.      Pharynx: No oropharyngeal exudate. Eyes:      Pupils: Pupils are equal, round, and reactive to light. Cardiovascular:      Rate and Rhythm: Normal rate and regular rhythm. Heart sounds: Normal heart sounds. No murmur heard. Pulmonary:      Effort: Pulmonary effort is normal. No respiratory distress. Breath sounds: Normal breath sounds. No wheezing, rhonchi or rales. Abdominal:      Palpations: Abdomen is soft. Musculoskeletal:         General: No swelling or deformity. Normal range of motion.       Cervical back: Normal range of motion and neck supple. No rigidity. Lymphadenopathy:      Cervical: No cervical adenopathy. Skin:     General: Skin is warm and dry. Coloration: Skin is not jaundiced. Findings: No bruising. Neurological:      General: No focal deficit present. Mental Status: She is alert and oriented to person, place, and time. Mental status is at baseline. Cranial Nerves: No cranial nerve deficit. Motor: No weakness. Psychiatric:         Mood and Affect: Mood normal.         Behavior: Behavior normal.         Thought Content: Thought content normal.         ASSESSMENT & PLAN     Diagnosis Orders   1. Well adult health check  CBC    COMPREHENSIVE METABOLIC PANEL   2. Palpitations  propranolol (INDERAL LA) 80 MG extended release capsule   3. Mitral valve prolapse     4. Lipid screening  LIPID PANEL   5. Thyroid disorder screen  TSH with Reflex   6. Colon cancer screening  [facility], [facility]   Patient advised to continue with healthy lifestyle work on regular exercise. Minimize stress as possible. Patient will get CBC, CMP, lipid panel and TSH drawn today and have her follow-up for results. We will reschedule colonoscopy with Dr. Bella Etienne and have her follow-up thereafter. She is to call with questions or problems. Refills provided on meds as needed she is to call with questions or problems. Return in about 6 months (around 5/17/2022).          Electronically signed by Jared Brennan MD on 11/17/2021

## 2021-12-08 ENCOUNTER — OFFICE VISIT (OUTPATIENT)
Dept: SURGERY | Age: 56
End: 2021-12-08
Payer: COMMERCIAL

## 2021-12-08 VITALS
DIASTOLIC BLOOD PRESSURE: 78 MMHG | HEART RATE: 83 BPM | HEIGHT: 66 IN | WEIGHT: 175.1 LBS | BODY MASS INDEX: 28.14 KG/M2 | OXYGEN SATURATION: 98 % | SYSTOLIC BLOOD PRESSURE: 118 MMHG

## 2021-12-08 DIAGNOSIS — Z12.11 ENCOUNTER FOR SCREENING COLONOSCOPY: Primary | ICD-10-CM

## 2021-12-08 PROCEDURE — 99203 OFFICE O/P NEW LOW 30 MIN: CPT | Performed by: SURGERY

## 2021-12-08 RX ORDER — SODIUM CHLORIDE 9 MG/ML
25 INJECTION, SOLUTION INTRAVENOUS PRN
Status: CANCELLED | OUTPATIENT
Start: 2021-12-08

## 2021-12-08 RX ORDER — SODIUM CHLORIDE 0.9 % (FLUSH) 0.9 %
10 SYRINGE (ML) INJECTION EVERY 12 HOURS SCHEDULED
Status: CANCELLED | OUTPATIENT
Start: 2021-12-08

## 2021-12-08 RX ORDER — SODIUM CHLORIDE 0.9 % (FLUSH) 0.9 %
10 SYRINGE (ML) INJECTION PRN
Status: CANCELLED | OUTPATIENT
Start: 2021-12-08

## 2021-12-08 RX ORDER — SODIUM CHLORIDE, SODIUM LACTATE, POTASSIUM CHLORIDE, CALCIUM CHLORIDE 600; 310; 30; 20 MG/100ML; MG/100ML; MG/100ML; MG/100ML
INJECTION, SOLUTION INTRAVENOUS CONTINUOUS
Status: CANCELLED | OUTPATIENT
Start: 2021-12-08

## 2021-12-08 ASSESSMENT — PATIENT HEALTH QUESTIONNAIRE - PHQ9
SUM OF ALL RESPONSES TO PHQ QUESTIONS 1-9: 0
2. FEELING DOWN, DEPRESSED OR HOPELESS: 0
1. LITTLE INTEREST OR PLEASURE IN DOING THINGS: 0
SUM OF ALL RESPONSES TO PHQ9 QUESTIONS 1 & 2: 0
SUM OF ALL RESPONSES TO PHQ QUESTIONS 1-9: 0
SUM OF ALL RESPONSES TO PHQ QUESTIONS 1-9: 0

## 2021-12-08 NOTE — PROGRESS NOTES
Chief Complaint   Patient presents with    Surgical Consult     consult for cscope       SUBJECTIVE:  HPI:   12/2/20  Patient  presents today for evaluation and possible need of colonoscopy. Patient has not had colonoscopy in the past.      Patient has been experiencing the following symptoms:  none. The patient denies: melena, weight loss or blood in the stools. There is no family history of colon cancer. 12/8/2021  Seen again today. She never rescheduled her colonoscopy a year ago but wishes to proceed now. No changes in health or bowel function over the past year. I have reviewed the patient's(pertinent information to this visit) medical history, family history(scanned in  the Media tab under \"patient questioner\"), social history and review ofsystems with the patient today in the office. Past Surgical History:   Procedure Laterality Date    CHOLECYSTECTOMY         No past medical history on file. Family History   Problem Relation Age of Onset    Diabetes Paternal Grandmother        Social History     Socioeconomic History    Marital status:      Spouse name: Not on file    Number of children: Not on file    Years of education: Not on file    Highest education level: Not on file   Occupational History    Not on file   Tobacco Use    Smoking status: Never Smoker    Smokeless tobacco: Never Used   Substance and Sexual Activity    Alcohol use: Not Currently    Drug use: Never    Sexual activity: Not on file   Other Topics Concern    Not on file   Social History Narrative    Not on file     Social Determinants of Health     Financial Resource Strain:     Difficulty of Paying Living Expenses: Not on file   Food Insecurity:     Worried About Running Out of Food in the Last Year: Not on file    Kavitha of Food in the Last Year: Not on file   Transportation Needs:     Lack of Transportation (Medical): Not on file    Lack of Transportation (Non-Medical):  Not on file Physical Activity:     Days of Exercise per Week: Not on file    Minutes of Exercise per Session: Not on file   Stress:     Feeling of Stress : Not on file   Social Connections:     Frequency of Communication with Friends and Family: Not on file    Frequency of Social Gatherings with Friends and Family: Not on file    Attends [employer] Services: Not on file    Active Member of [address] [place] [address] or Organizations: Not on file    Attends Club or Organization Meetings: Not on file    Marital Status: Not on file   Intimate Partner Violence:     Fear of Current or Ex-Partner: Not on file    Emotionally Abused: Not on file    Physically Abused: Not on file    Sexually Abused: Not on file   Housing Stability:     Unable to Pay for Housing in the Last Year: Not on file    Number of [place] in the Last Year: Not on file    Unstable Housing in the Last Year: Not on file       Current Outpatient Medications   Medication Sig Dispense Refill    calcium carbonate (OSCAL) 500 MG TABS tablet Take 500 mg by mouth daily      propranolol (INDERAL LA) 80 MG extended release capsule Take 1 tablet by mouth daily 90 capsule 1    aspirin 81 MG chewable tablet Take 81 mg by mouth daily      Cholecalciferol (VITAMIN D) 50 MCG (2000 UT) CAPS capsule Take 1 capsule by mouth daily       Current Facility-Administered Medications   Medication Dose Route Frequency Provider Last Rate Last Admin    sodium chloride flush 0.9 % injection 10 mL  10 mL IntraVENous 2 times per day Navjot Conway MD        sodium chloride flush 0.9 % injection 10 mL  10 mL IntraVENous PRN Navjot Conway MD            No Known Allergies    Review of Systems:       Constitutional: Negative for chills. Negative for fever. HENT: Negative for congestion. Negative for rhinorrhea. Respiratory: Negative for cough. Negative for shortness of breath. Negative for wheezing. Cardiovascular: Negative for chest pain.  Hx of mitral prolapse  Gastrointestinal:  Negative for constipation. Negative for diarrhea. Negative for nausea and vomiting. Genitourinary: Negative for difficulty urinating. Neurological: Negative for dizziness, syncope and numbness. Hematological: Does not bruise/bleed easily. OBJECTIVE:  Physical Exam:    /78 (Site: Right Upper Arm, Position: Sitting, Cuff Size: Large Adult)   Pulse 83   Ht 5' 6\" (1.676 m)   Wt 175 lb 1.6 oz (79.4 kg)   SpO2 98%   BMI 28.26 kg/m²      Physical Exam  General: awake, alert, in no acute distress  HEENT: mucous membranes moist  Respiratory: normal effort, no wheezes appreciated  CV: appears well perfused, regular rate and rhythm  Abdomen: Soft, non-tender, non-distended. No guarding or rebound tenderness. Skin: warm and dry  Extremities: atraumatic  Neuro: no focal deficits noted  Psych: mood normal        ASSESSMENT:  1. Encounter for screening colonoscopy          PLAN:  Treatment:    -Will proceed with colonoscopy. -Reviewed in detail with the patient and/or family the expected pre-operative, operative, and post-operative courses including risks, benefits, and alternatives to the procedure. The patient's questions were answered in detail and agreed to proceed with the procedure.     -Bowel prep instructions / script provided and discussed with patient.        Abel Montesinos MD

## 2021-12-10 ENCOUNTER — TELEPHONE (OUTPATIENT)
Dept: SURGERY | Age: 56
End: 2021-12-10

## 2021-12-10 NOTE — TELEPHONE ENCOUNTER
Per call to [facility]/ Dee Dee Anderson  The colonoscopy is a covered benefit. The services must be medically necessary. They cannot be experimental or investigative. Any illnesses or injuries that are work related and covered under workman's compensation are not covered. Authorization is not required.   Your reference number is [id number]

## 2021-12-13 ENCOUNTER — TELEPHONE (OUTPATIENT)
Dept: SURGERY | Age: 56
End: 2021-12-13

## 2022-01-03 RX ORDER — SODIUM, POTASSIUM,MAG SULFATES 17.5-3.13G
2 SOLUTION, RECONSTITUTED, ORAL ORAL ONCE
Qty: 354 ML | Refills: 0 | Status: SHIPPED | OUTPATIENT
Start: 2022-01-03 | End: 2022-01-03

## 2022-01-03 NOTE — PROGRESS NOTES
Bushra Osorio called asking for a renewal of her prep for her upcoming procedure. Suprep sent to pharmacy.

## 2022-01-10 NOTE — PROGRESS NOTES
Patient will arrive at 0600 on 1/19/2022 for her procedure at 0800. 1. Do not eat or drink anything after midnight - unless instructed by your doctor prior to surgery. This includes                   no water, chewing gum or mints. 2. Follow your directions as prescribed by the doctor for your procedure and medications. 3. Check with your Doctor regarding stopping vitamins, supplements, blood thinners (Plavix, Coumadin, Lovenox, Effient, Pradaxa, Xarelto, Fragmin or                   other blood thinners) and follow their instructions. 4. Do not smoke, and do not drink any alcoholic beverages 24 hours prior to surgery. This includes NA Beer. 5. You may brush your teeth and gargle the morning of surgery. DO NOT SWALLOW WATER   6. You MUST make arrangements for a responsible adult to take you home after your surgery and be able to check on you every couple                   hours for the day. You will not be allowed to leave alone or drive yourself home. It is strongly suggested someone stay with you the first 24                   hrs. Your surgery will be cancelled if you do not have a ride home. 7. Please wear simple, loose fitting clothing to the hospital.  [place] not bring valuables (money, credit cards, checkbooks, etc.) Do not wear any                   makeup (including no eye makeup) or nail polish on your fingers or toes. 8. DO NOT wear any jewelry or piercings on day of surgery. All body piercing jewelry must be removed. 9. If you have dentures, they will be removed before going to the OR; we will provide you a container. If you wear contact lenses or glasses,                  they will be removed; please bring a case for them. 10. If you  have a Living Will and Durable Power of  for Healthcare, please bring in a copy.            11. Please bring picture ID,  insurance card, paperwork from the doctors office    (H & P, Consent, & card for implantable devices). 12. Take a shower the night before or morning of your procedure, do not apply any lotion, oil or powder. 13. Wear a mask covering your nose & mouth when entering the hospital. Have your covid-19 test performed within 2-7 days of your                  surgery. Quarantine yourself after the test until after your surgery.

## 2022-01-11 DIAGNOSIS — Z01.818 PRE-OP TESTING: Primary | ICD-10-CM

## 2022-01-14 ENCOUNTER — NURSE ONLY (OUTPATIENT)
Dept: SURGERY | Age: 57
End: 2022-01-14
Payer: COMMERCIAL

## 2022-01-14 ENCOUNTER — HOSPITAL ENCOUNTER (OUTPATIENT)
Age: 57
Setting detail: SPECIMEN
Discharge: HOME | End: 2022-01-14
Payer: COMMERCIAL

## 2022-01-14 DIAGNOSIS — Z01.818 PRE-OP TESTING: Primary | ICD-10-CM

## 2022-01-14 PROCEDURE — U0005 INFEC AGEN DETEC AMPLI PROBE: HCPCS

## 2022-01-14 PROCEDURE — U0003 INFECTIOUS AGENT DETECTION BY NUCLEIC ACID (DNA OR RNA); SEVERE ACUTE RESPIRATORY SYNDROME CORONAVIRUS 2 (SARS-COV-2) (CORONAVIRUS DISEASE [COVID-19]), AMPLIFIED PROBE TECHNIQUE, MAKING USE OF HIGH THROUGHPUT TECHNOLOGIES AS DESCRIBED BY CMS-2020-01-R: HCPCS

## 2022-01-14 PROCEDURE — 99211 OFF/OP EST MAY X REQ PHY/QHP: CPT | Performed by: SURGERY

## 2022-01-14 NOTE — PROGRESS NOTES
Patient collected pre-op COVID-19 screening instruction and collection supplies given to patient accordingly. Patient denies fever/cough/sob or recent travels. Patient voiced understanding of collection/quarantine instructions. COVID screening lab ordered, collection completed without difficulty, identifiers placed on specimen. AVS given at discharge. Results will be given via mychart or telephone call [facility] Dept will manage any positive test results, the procedure will be rescheduled at a later date).

## 2022-01-14 NOTE — PATIENT INSTRUCTIONS
Pre-Procedure COVID-19 Self Testing  Quarantine Instructions  Day of Surgery Instructions         What to do before my surgery:    All patients scheduled for elective surgery must test for COVID19 72-96 hours prior to the surgery date.  Pre-Procedure COVID-19 Self-Test will be scheduled for you by your provider.  You can receive your Pre-Procedure COVID-19 Self-Test at:  [facility]. [address] [facility], [address] [facility] [address]   If you do not have the COVID-19 test we will cancel or reschedule your procedure   Once you test you must quarantine at home until after your procedure with only your immediate family members or whoever lives with you.  If you must work during your quarantine period, we ask that you continue to practice social distancing, wear a mask that covers your mouth and nose and perform all hand hygiene as recommended by the CDC.  If you must go to the grocery, etc. and cannot get someone to do this for you please wear a mask that covers your mouth and nose and perform all hand hygiene as recommended by the CDC.  Your surgeon's office will notify you with any concerns about your test result. What can I expect on the day of surgery?  Arrive at the time the office or hospital staff tell you on the day of your procedure.  Wear a mask when entering the hospital.     A member of the hospital staff will take your temperature and ask you a few questions as you enter the building.  In abundance of caution for the safety of all our patients and staff, please follow all hospital visitor guidelines in place at the time of your procedure. The staff caring for you will stay in close communication with your loved one and keep them updated on progress.  Please provide a phone number for us to use when communicating with your family or ride home.    When you are ready to discharge, we will notify your family/person with you to bring the car to the front entrance. We will take you to them after you receive all of your discharge instructions.

## 2022-01-15 LAB
SARS-COV-2: NOT DETECTED
SOURCE: NORMAL

## 2022-01-18 ENCOUNTER — ANESTHESIA EVENT (OUTPATIENT)
Dept: ENDOSCOPY | Age: 57
End: 2022-01-18
Payer: COMMERCIAL

## 2022-01-18 NOTE — ANESTHESIA PRE PROCEDURE
Department of Anesthesiology  Preprocedure Note       Name:  Rolley Goldberg   Age:  64 y.o.  :  1965                                          MRN:  [id number]         Date:  2022      Surgeon: Ai Bianchi):  Vikki Damian MD    Procedure: Procedure(s):  COLORECTAL CANCER SCREENING, NOT HIGH RISK    Medications prior to admission:   Prior to Admission medications    Medication Sig Start Date End Date Taking? Authorizing Provider   calcium carbonate (OSCAL) 500 MG TABS tablet Take 500 mg by mouth daily    Historical Provider, MD   propranolol (INDERAL LA) 80 MG extended release capsule Take 1 tablet by mouth daily 21   Chelsea Mondragon MD   aspirin 81 MG chewable tablet Take 81 mg by mouth daily    Historical Provider, MD   Cholecalciferol (VITAMIN D) 50 MCG ( UT) CAPS capsule Take 1 capsule by mouth daily    Historical Provider, MD       Current medications:    No current facility-administered medications for this encounter. Current Outpatient Medications   Medication Sig Dispense Refill    calcium carbonate (OSCAL) 500 MG TABS tablet Take 500 mg by mouth daily      propranolol (INDERAL LA) 80 MG extended release capsule Take 1 tablet by mouth daily 90 capsule 1    aspirin 81 MG chewable tablet Take 81 mg by mouth daily      Cholecalciferol (VITAMIN D) 50 MCG ( UT) CAPS capsule Take 1 capsule by mouth daily         Allergies:  No Known Allergies    Problem List:    Patient Active Problem List   Diagnosis Code    Palpitations R00.2    Mitral valve prolapse I34.1       Past Medical History:        Diagnosis Date    Mitral valve prolapse     Palpitations        Past Surgical History:        Procedure Laterality Date    CHOLECYSTECTOMY      FOOT SURGERY Left     tarsal tunnel and plantar faciitis    LEEP         Social History:    Social History     Tobacco Use    Smoking status: Never Smoker    Smokeless tobacco: Never Used   Substance Use Topics    Alcohol use:  Yes Comment: rare                                Counseling given: Not Answered      Vital Signs (Current):   Vitals:    01/10/22 1209   Weight: 168 lb (76.2 kg)   Height: 5' 6\" (1.676 m)                                              BP Readings from Last 3 Encounters:   12/08/21 118/78   11/17/21 118/80   12/02/20 (!) 140/86       NPO Status:                                                                                 BMI:   Wt Readings from Last 3 Encounters:   12/08/21 175 lb 1.6 oz (79.4 kg)   11/17/21 170 lb (77.1 kg)   12/02/20 170 lb 1.6 oz (77.2 kg)     Body mass index is 27.12 kg/m². CBC:   Lab Results   Component Value Date    WBC 4.6 11/17/2021    RBC 4.74 11/17/2021    HGB 13.9 11/17/2021    HCT 43.0 11/17/2021    MCV 90.7 11/17/2021    RDW 13.5 11/17/2021     11/17/2021       CMP:   Lab Results   Component Value Date     11/17/2021    K 4.2 11/17/2021     11/17/2021    CO2 26 11/17/2021    BUN 16 11/17/2021    CREATININE 0.6 11/17/2021    GFRAA >60 11/17/2021    AGRATIO 1.7 11/17/2021    LABGLOM >60 11/17/2021    GLUCOSE 106 11/17/2021    PROT 7.0 11/17/2021    PROT 7.1 10/12/2010    CALCIUM 9.7 11/17/2021    BILITOT 0.9 11/17/2021    ALKPHOS 82 11/17/2021    AST 16 11/17/2021    ALT 9 11/17/2021       POC Tests: No results for input(s): POCGLU, POCNA, POCK, POCCL, POCBUN, POCHEMO, POCHCT in the last 72 hours.     Coags: No results found for: PROTIME, INR, APTT    HCG (If Applicable): No results found for: PREGTESTUR, PREGSERUM, HCG, HCGQUANT     ABGs: No results found for: PHART, PO2ART, GWD8CVH, BMK0MPA, BEART, H4SEYJVN     Type & Screen (If Applicable):  No results found for: LABABO, LABRH    Drug/Infectious Status (If Applicable):  No results found for: HIV, HEPCAB    COVID-19 Screening (If Applicable):   Lab Results   Component Value Date    COVID19 NOT DETECTED 01/14/2022           Anesthesia Evaluation  Patient summary reviewed  Airway:         Dental:          Pulmonary: Cardiovascular:    (+) valvular problems/murmurs: MVP,          Beta Blocker:  Dose within 24 Hrs         Neuro/Psych:               GI/Hepatic/Renal:   (+) bowel prep,           Endo/Other:                     Abdominal:             Vascular: Other Findings:             Anesthesia Plan      MAC     ASA 2     (Chart review 1/18/22)  Induction: intravenous.                           YUNIEL Foster - SUHAIL   1/18/2022

## 2022-01-19 ENCOUNTER — CLINICAL DOCUMENTATION (OUTPATIENT)
Dept: OTHER | Facility: CLINIC | Age: 57
End: 2022-01-19

## 2022-01-19 ENCOUNTER — HOSPITAL ENCOUNTER (OUTPATIENT)
Age: 57
Setting detail: OUTPATIENT SURGERY
Discharge: HOME | End: 2022-01-19
Attending: SURGERY | Admitting: SURGERY
Payer: COMMERCIAL

## 2022-01-19 ENCOUNTER — ANESTHESIA (OUTPATIENT)
Dept: ENDOSCOPY | Age: 57
End: 2022-01-19
Payer: COMMERCIAL

## 2022-01-19 VITALS
DIASTOLIC BLOOD PRESSURE: 74 MMHG | TEMPERATURE: 97.3 F | OXYGEN SATURATION: 99 % | HEIGHT: 66 IN | WEIGHT: 168 LBS | SYSTOLIC BLOOD PRESSURE: 118 MMHG | BODY MASS INDEX: 27 KG/M2 | RESPIRATION RATE: 16 BRPM | HEART RATE: 67 BPM

## 2022-01-19 VITALS
RESPIRATION RATE: 22 BRPM | OXYGEN SATURATION: 99 % | SYSTOLIC BLOOD PRESSURE: 86 MMHG | DIASTOLIC BLOOD PRESSURE: 47 MMHG

## 2022-01-19 DIAGNOSIS — Z12.11 COLON CANCER SCREENING: ICD-10-CM

## 2022-01-19 PROCEDURE — 6360000002 HC RX W HCPCS

## 2022-01-19 PROCEDURE — 7100000011 HC PHASE II RECOVERY - ADDTL 15 MIN: Performed by: SURGERY

## 2022-01-19 PROCEDURE — 3609010600 HC COLONOSCOPY POLYPECTOMY SNARE/COLD BIOPSY: Performed by: SURGERY

## 2022-01-19 PROCEDURE — 88305 TISSUE EXAM BY PATHOLOGIST: CPT

## 2022-01-19 PROCEDURE — 2500000003 HC RX 250 WO HCPCS

## 2022-01-19 PROCEDURE — 2709999900 HC NON-CHARGEABLE SUPPLY: Performed by: SURGERY

## 2022-01-19 PROCEDURE — 45380 COLONOSCOPY AND BIOPSY: CPT | Performed by: SURGERY

## 2022-01-19 PROCEDURE — 2580000003 HC RX 258: Performed by: SURGERY

## 2022-01-19 PROCEDURE — 3700000000 HC ANESTHESIA ATTENDED CARE: Performed by: SURGERY

## 2022-01-19 PROCEDURE — 7100000010 HC PHASE II RECOVERY - FIRST 15 MIN: Performed by: SURGERY

## 2022-01-19 PROCEDURE — 3700000001 HC ADD 15 MINUTES (ANESTHESIA): Performed by: SURGERY

## 2022-01-19 RX ORDER — SODIUM CHLORIDE 0.9 % (FLUSH) 0.9 %
10 SYRINGE (ML) INJECTION PRN
Status: DISCONTINUED | OUTPATIENT
Start: 2022-01-19 | End: 2022-01-19 | Stop reason: HOSPADM

## 2022-01-19 RX ORDER — SODIUM CHLORIDE 0.9 % (FLUSH) 0.9 %
10 SYRINGE (ML) INJECTION EVERY 12 HOURS SCHEDULED
Status: DISCONTINUED | OUTPATIENT
Start: 2022-01-19 | End: 2022-01-19 | Stop reason: HOSPADM

## 2022-01-19 RX ORDER — SODIUM CHLORIDE 9 MG/ML
25 INJECTION, SOLUTION INTRAVENOUS PRN
Status: DISCONTINUED | OUTPATIENT
Start: 2022-01-19 | End: 2022-01-19 | Stop reason: HOSPADM

## 2022-01-19 RX ORDER — LIDOCAINE HYDROCHLORIDE 20 MG/ML
INJECTION, SOLUTION INFILTRATION; PERINEURAL PRN
Status: DISCONTINUED | OUTPATIENT
Start: 2022-01-19 | End: 2022-01-19 | Stop reason: SDUPTHER

## 2022-01-19 RX ORDER — PROPOFOL 10 MG/ML
INJECTION, EMULSION INTRAVENOUS PRN
Status: DISCONTINUED | OUTPATIENT
Start: 2022-01-19 | End: 2022-01-19 | Stop reason: SDUPTHER

## 2022-01-19 RX ORDER — SODIUM CHLORIDE, SODIUM LACTATE, POTASSIUM CHLORIDE, CALCIUM CHLORIDE 600; 310; 30; 20 MG/100ML; MG/100ML; MG/100ML; MG/100ML
INJECTION, SOLUTION INTRAVENOUS CONTINUOUS
Status: DISCONTINUED | OUTPATIENT
Start: 2022-01-19 | End: 2022-01-19 | Stop reason: HOSPADM

## 2022-01-19 RX ADMIN — SODIUM CHLORIDE, POTASSIUM CHLORIDE, SODIUM LACTATE AND CALCIUM CHLORIDE: 600; 310; 30; 20 INJECTION, SOLUTION INTRAVENOUS at 08:00

## 2022-01-19 RX ADMIN — LIDOCAINE HYDROCHLORIDE 100 MG: 20 INJECTION, SOLUTION INFILTRATION; PERINEURAL at 08:01

## 2022-01-19 RX ADMIN — PROPOFOL 250 MG: 10 INJECTION, EMULSION INTRAVENOUS at 08:01

## 2022-01-19 ASSESSMENT — PAIN - FUNCTIONAL ASSESSMENT: PAIN_FUNCTIONAL_ASSESSMENT: 0-10

## 2022-01-19 ASSESSMENT — PAIN SCALES - GENERAL: PAINLEVEL_OUTOF10: 0

## 2022-01-19 NOTE — PROGRESS NOTES
Returned to room 7. Report received from [facility]. Alert and oriented. Respirations even and unlabored. Color pink. Abdomen soft and nontender. Refuses beverage at this time. Call light in reach.  at bedside.

## 2022-01-19 NOTE — H&P
No chief complaint on file. SUBJECTIVE:  HPI:   12/2/20  Patient  presents today for evaluation and possible need of colonoscopy. Patient has not had colonoscopy in the past.      Patient has been experiencing the following symptoms:  none. The patient denies: melena, weight loss or blood in the stools. There is no family history of colon cancer. 12/8/2021  Seen again today. She never rescheduled her colonoscopy a year ago but wishes to proceed now. No changes in health or bowel function over the past year. 1/19/2022  Seen and examined again today. Here for screening colonoscopy. Denies changes in their health since last seen. Denies questions regarding surgery today. I have reviewed the patient's(pertinent information to this visit) medical history, family history(scanned in  the Media tab under \"patient questioner\"), social history and review ofsystems with the patient today in the office. Past Surgical History:   Procedure Laterality Date    CHOLECYSTECTOMY      FOOT SURGERY Left     tarsal tunnel and plantar faciitis    LEEP         Past Medical History:   Diagnosis Date    Mitral valve prolapse     Palpitations        Family History   Problem Relation Age of Onset    Diabetes Paternal Grandmother     Cancer Mother        Social History     Socioeconomic History    Marital status:       Spouse name: Not on file    Number of children: Not on file    Years of education: Not on file    Highest education level: Not on file   Occupational History    Not on file   Tobacco Use    Smoking status: Never Smoker    Smokeless tobacco: Never Used   Vaping Use    Vaping Use: Never used   Substance and Sexual Activity    Alcohol use: Yes     Comment: rare    Drug use: Never    Sexual activity: Not on file   Other Topics Concern    Not on file   Social History Narrative    Not on file     Social Determinants of Health     Financial Resource Strain:     Difficulty of Paying Living Expenses: Not on file   Food Insecurity:     Worried About [address] in the Last Year: Not on file    Ran Out of Food in the Last Year: Not on file   Transportation Needs:     Lack of Transportation (Medical): Not on file    Lack of Transportation (Non-Medical): Not on file   Physical Activity:     Days of Exercise per Week: Not on file    Minutes of Exercise per Session: Not on file   Stress:     Feeling of Stress : Not on file   Social Connections:     Frequency of Communication with Friends and Family: Not on file    Frequency of Social Gatherings with Friends and Family: Not on file    Attends Congregational Services: Not on file    Active Member of [address] [place] [address] or Organizations: Not on file    Attends Club or Organization Meetings: Not on file    Marital Status: Not on file   Intimate Partner Violence:     Fear of Current or Ex-Partner: Not on file    Emotionally Abused: Not on file    Physically Abused: Not on file    Sexually Abused: Not on file   Housing Stability:     Unable to Pay for Housing in the Last Year: Not on file    Number of [place] in the Last Year: Not on file    Unstable Housing in the Last Year: Not on file       Current Facility-Administered Medications   Medication Dose Route Frequency Provider Last Rate Last Admin    0.9 % sodium chloride infusion  25 mL IntraVENous PRN Leonora Cruz MD        lactated ringers infusion   IntraVENous Continuous Leonora Cruz MD        sodium chloride flush 0.9 % injection 10 mL  10 mL IntraVENous 2 times per day Leonora Cruz MD        sodium chloride flush 0.9 % injection 10 mL  10 mL IntraVENous PRN Leonora Cruz MD            No Known Allergies    Review of Systems:       Constitutional: Negative for chills. Negative for fever. HENT: Negative for congestion. Negative for rhinorrhea. Respiratory: Negative for cough. Negative for shortness of breath. Negative for wheezing. Cardiovascular: Negative for chest pain. Hx of mitral prolapse  Gastrointestinal:  Negative for constipation. Negative for diarrhea. Negative for nausea and vomiting. Genitourinary: Negative for difficulty urinating. Neurological: Negative for dizziness, syncope and numbness. Hematological: Does not bruise/bleed easily. OBJECTIVE:  Physical Exam:    /81   Pulse 94   Temp 98 °F (36.7 °C) (Temporal)   Resp 16   Ht 5' 6\" (1.676 m)   Wt 168 lb (76.2 kg)   LMP 02/10/2021 Comment: Negative  SpO2 99%   BMI 27.12 kg/m²      Physical Exam  General: awake, alert, in no acute distress  HEENT: mucous membranes moist  Respiratory: normal effort, no wheezes appreciated  CV: appears well perfused  Skin: warm and dry  Extremities: atraumatic  Neuro: no focal deficits noted  Psych: mood normal        ASSESSMENT:  Screening for colon cancer    PLAN:  Treatment:    -Will proceed with colonoscopy. -Reviewed in detail with the patient and/or family the expected pre-operative, operative, and post-operative courses including risks, benefits, and alternatives to the procedure. The patient's questions were answered in detail and agreed to proceed with the procedure.      Jaylin Aguilera MD

## 2022-01-19 NOTE — PROCEDURES
PROCEDURE NOTE    DATE OF PROCEDURE: 1/19/2022    SURGEON: Josef Burris MD, M.D.    Priya Jennings: None    PREOPERATIVE DIAGNOSIS: screening for colonoscopy    POSTOPERATIVE DIAGNOSIS:   1. Rectal polyp  2. Grade I internal hemorrhoid    OPERATION: Total colonoscopy with polypectomy    ANESTHESIA: Local monitored anesthesia. ESTIMATED BLOOD LOSS: None. COMPLICATIONS: None. SPECIMENS: were obtained    HISTORY: The patient is a 64y.o. year old female with history of above preop diagnosis. I recommended colonoscopy with possible biopsy or polypectomy and I explained the risk, benefits, expected outcome, and alternatives to the procedure. Risks included but are not limited to bleeding, infection, respiratory distress, hypotension, and perforation of the colon. The patient understands and was in agreement. PROCEDURE: The patient was given sedation per anesthesia. The patient was given oxygen by nasal cannula. The patient's SPO2 remained appropriate throughout the procedure. The colonoscope was inserted per rectum and advanced under direct vision to the cecum without difficulty. Findings:  Cecum/Ascending colon: normal    Transverse colon: normal    Descending/Sigmoid colon: normal    Rectum/Anus: examined in normal and retroflexed positions and was positive for a small 3mm polyp at 7cm from the anal verge, likely hyperplastic. Polyp was removed with jumbo forcep. On retroflexion there was a small amount of excess internal hemorrhoid tissue noted. The colon was decompressed and the scope was removed. The patient tolerated the procedure well. IMPRESSION/PLAN:   1. Rectal polyp--will follow up pathology. If hyperplastic I would recommend repeat colonoscopy in 10 years  2. Internal hemorrhoid--recommend high fiber diet or taking a fiber supplement.             Electronically signed by Josef Burris MD on 1/19/2022 at 8:24 AM

## 2022-01-19 NOTE — ANESTHESIA POSTPROCEDURE EVALUATION
Department of Anesthesiology  Postprocedure Note    Patient: Georgina Valdez  MRN: [id number]  YOB: 1965  Date of evaluation: 1/19/2022  Time:  8:29 AM     Procedure Summary     Date: 01/19/22 Room / Location: [address] [place] [address]    Anesthesia Start: 0800 Anesthesia Stop: 0829    Procedure: COLONOSCOPY POLYPECTOMY SNARE/COLD BIOPSY (N/A ) Diagnosis:       Colon cancer screening      (Colon cancer screening [Z12.11])    Surgeons: Josef Burris MD Responsible Provider: Pilar Edward MD    Anesthesia Type: MAC ASA Status: 2          Anesthesia Type: MAC    Kareem Phase I:      Kareem Phase II:      Last vitals: Reviewed and per EMR flowsheets.        Anesthesia Post Evaluation    Patient location during evaluation: bedside  Patient participation: complete - patient participated  Level of consciousness: awake and alert  Pain score: 0  Airway patency: patent  Nausea & Vomiting: no vomiting and no nausea  Complications: no  Cardiovascular status: hemodynamically stable  Respiratory status: room air  Hydration status: stable

## 2022-01-19 NOTE — ANESTHESIA PRE PROCEDURE
Department of Anesthesiology  Preprocedure Note       Name:  Jerona Aase   Age:  64 y.o.  :  1965                                          MRN:  [id number]         Date:  2022      Surgeon: Francisco Mary):  Adriano Burris MD    Procedure: Procedure(s):  COLORECTAL CANCER SCREENING, NOT HIGH RISK    Medications prior to admission:   Prior to Admission medications    Medication Sig Start Date End Date Taking? Authorizing Provider   calcium carbonate (OSCAL) 500 MG TABS tablet Take 500 mg by mouth daily    Historical Provider, MD   propranolol (INDERAL LA) 80 MG extended release capsule Take 1 tablet by mouth daily 21   Naila Bojorquez MD   aspirin 81 MG chewable tablet Take 81 mg by mouth daily    Historical Provider, MD   Cholecalciferol (VITAMIN D) 50 MCG (2000) CAPS capsule Take 1 capsule by mouth daily    Historical Provider, MD       Current medications:    No current facility-administered medications for this visit. No current outpatient medications on file.      Facility-Administered Medications Ordered in Other Visits   Medication Dose Route Frequency Provider Last Rate Last Admin    0.9 % sodium chloride infusion  25 mL IntraVENous PRN Adriano Burris MD        lactated ringers infusion   IntraVENous Continuous Adriano Burris MD        sodium chloride flush 0.9 % injection 10 mL  10 mL IntraVENous 2 times per day Adriano Burris MD        sodium chloride flush 0.9 % injection 10 mL  10 mL IntraVENous PRN Adriano Burris MD           Allergies:  No Known Allergies    Problem List:    Patient Active Problem List   Diagnosis Code    Palpitations R00.2    Mitral valve prolapse I34.1       Past Medical History:        Diagnosis Date    Mitral valve prolapse     Palpitations        Past Surgical History:        Procedure Laterality Date    CHOLECYSTECTOMY      FOOT SURGERY Left     tarsal tunnel and plantar faciitis    LEEP         Social History: Social History     Tobacco Use    Smoking status: Never Smoker    Smokeless tobacco: Never Used   Substance Use Topics    Alcohol use: Yes     Comment: rare                                Counseling given: Not Answered      Vital Signs (Current): There were no vitals filed for this visit. BP Readings from Last 3 Encounters:   01/19/22 107/81   12/08/21 118/78   11/17/21 118/80       NPO Status:                                                                                 BMI:   Wt Readings from Last 3 Encounters:   01/19/22 168 lb (76.2 kg)   12/08/21 175 lb 1.6 oz (79.4 kg)   11/17/21 170 lb (77.1 kg)     There is no height or weight on file to calculate BMI.    CBC:   Lab Results   Component Value Date    WBC 4.6 11/17/2021    RBC 4.74 11/17/2021    HGB 13.9 11/17/2021    HCT 43.0 11/17/2021    MCV 90.7 11/17/2021    RDW 13.5 11/17/2021     11/17/2021       CMP:   Lab Results   Component Value Date     11/17/2021    K 4.2 11/17/2021     11/17/2021    CO2 26 11/17/2021    BUN 16 11/17/2021    CREATININE 0.6 11/17/2021    GFRAA >60 11/17/2021    AGRATIO 1.7 11/17/2021    LABGLOM >60 11/17/2021    GLUCOSE 106 11/17/2021    PROT 7.0 11/17/2021    PROT 7.1 10/12/2010    CALCIUM 9.7 11/17/2021    BILITOT 0.9 11/17/2021    ALKPHOS 82 11/17/2021    AST 16 11/17/2021    ALT 9 11/17/2021       POC Tests: No results for input(s): POCGLU, POCNA, POCK, POCCL, POCBUN, POCHEMO, POCHCT in the last 72 hours.     Coags: No results found for: PROTIME, INR, APTT    HCG (If Applicable): No results found for: PREGTESTUR, PREGSERUM, HCG, HCGQUANT     ABGs: No results found for: PHART, PO2ART, SKG8GJX, FOJ8BSY, BEART, [id number]     Type & Screen (If Applicable):  No results found for: LABABO, LABRH    Drug/Infectious Status (If Applicable):  No results found for: HIV, HEPCAB    COVID-19 Screening (If Applicable):   Lab Results   Component Value Date    COVID19 NOT

## 2022-01-26 ENCOUNTER — VIRTUAL VISIT (OUTPATIENT)
Dept: SURGERY | Age: 57
End: 2022-01-26

## 2022-01-26 DIAGNOSIS — Z98.890 S/P COLONOSCOPY: Primary | ICD-10-CM

## 2022-01-26 NOTE — PROGRESS NOTES
I called and left a message for Mildred Wilson that the path from her rectal polyp removed during her colonoscopy showed it to be hyperplastic.   No increased risk of cancer.    - recommend repeat colonoscopy in 10 years  - high fiber diet given small internal hemorrhoid noted on colonoscopy    Electronically signed by Jhonny Obando MD on 1/26/2022 at 1:01 PM

## 2022-05-17 ENCOUNTER — OFFICE VISIT (OUTPATIENT)
Dept: FAMILY MEDICINE CLINIC | Age: 57
End: 2022-05-17
Payer: COMMERCIAL

## 2022-05-17 VITALS
HEIGHT: 66 IN | SYSTOLIC BLOOD PRESSURE: 138 MMHG | HEART RATE: 71 BPM | DIASTOLIC BLOOD PRESSURE: 72 MMHG | BODY MASS INDEX: 28.73 KG/M2 | OXYGEN SATURATION: 96 % | WEIGHT: 178.8 LBS

## 2022-05-17 DIAGNOSIS — R00.2 PALPITATIONS: ICD-10-CM

## 2022-05-17 PROCEDURE — 99213 OFFICE O/P EST LOW 20 MIN: CPT | Performed by: FAMILY MEDICINE

## 2022-05-17 RX ORDER — PROPRANOLOL HYDROCHLORIDE 80 MG/1
CAPSULE, EXTENDED RELEASE ORAL
Qty: 90 CAPSULE | Refills: 1 | Status: SHIPPED | OUTPATIENT
Start: 2022-05-17

## 2022-05-17 SDOH — ECONOMIC STABILITY: FOOD INSECURITY: WITHIN THE PAST 12 MONTHS, YOU WORRIED THAT YOUR FOOD WOULD RUN OUT BEFORE YOU GOT MONEY TO BUY MORE.: NEVER TRUE

## 2022-05-17 SDOH — ECONOMIC STABILITY: FOOD INSECURITY: WITHIN THE PAST 12 MONTHS, THE FOOD YOU BOUGHT JUST DIDN'T LAST AND YOU DIDN'T HAVE MONEY TO GET MORE.: NEVER TRUE

## 2022-05-17 ASSESSMENT — SOCIAL DETERMINANTS OF HEALTH (SDOH): HOW HARD IS IT FOR YOU TO PAY FOR THE VERY BASICS LIKE FOOD, HOUSING, MEDICAL CARE, AND HEATING?: NOT HARD AT ALL

## 2022-05-17 ASSESSMENT — ENCOUNTER SYMPTOMS
VOMITING: 0
SHORTNESS OF BREATH: 0
TROUBLE SWALLOWING: 0
ABDOMINAL PAIN: 0
WHEEZING: 0
EYE PAIN: 0
NAUSEA: 0
CHEST TIGHTNESS: 0
DIARRHEA: 0
BLOOD IN STOOL: 0

## 2022-05-17 NOTE — PROGRESS NOTES
5/17/2022    [facility]    Chief Complaint   Patient presents with    6 Month Follow-Up     No complaint. HPI  History was obtained from the patient. Prashant Andrea is a 64 y.o. female who presents today with routine follow-up history of palpitation mitral valve prolapse currently doing well no particular complaints of chest pain shortness of breath or unusual palpitations. Does continue to work full-time no increase stress other than son getting  shortly. She had labs in November 2021 and they look quite good. Mood remains positive and meds are currently well-tolerated. Patient states she has had her Tdap. She has had COVID shots x2 with COVID booster x1. Last mammogram was September 2021. She admits she could use refills and needs to work on exercise and mild weight loss. Can [place]aro REVIEW OF SYMPTOMS    Review of Systems   Constitutional: Negative for activity change and fatigue. HENT: Negative for congestion, hearing loss, mouth sores and trouble swallowing. Eyes: Negative for pain and visual disturbance. Respiratory: Negative for chest tightness, shortness of breath and wheezing. Cardiovascular: Positive for palpitations. Negative for chest pain. Gastrointestinal: Negative for abdominal pain, blood in stool, diarrhea, nausea and vomiting. Endocrine: Negative for polydipsia and polyuria. Genitourinary: Negative for dysuria, frequency and urgency. Musculoskeletal: Negative for arthralgias, gait problem and neck stiffness. Skin: Negative for rash. Allergic/Immunologic: Negative for environmental allergies. Neurological: Negative for dizziness, seizures, speech difficulty and weakness. Hematological: Does not bruise/bleed easily. Psychiatric/Behavioral: Negative for agitation, confusion, hallucinations, self-injury and suicidal ideas. The patient is not nervous/anxious.         PAST MEDICAL HISTORY  Past Medical History:   Diagnosis Date    Mitral valve prolapse     Palpitations        FAMILY HISTORY  Family History   Problem Relation Age of Onset    Diabetes Paternal Grandmother     Cancer Mother        SOCIAL HISTORY  Social History     Socioeconomic History    Marital status:      Spouse name: Not on file    Number of children: Not on file    Years of education: Not on file    Highest education level: Not on file   Occupational History    Not on file   Tobacco Use    Smoking status: Never Smoker    Smokeless tobacco: Never Used   Vaping Use    Vaping Use: Never used   Substance and Sexual Activity    Alcohol use: Yes     Comment: rare    Drug use: Never    Sexual activity: Not on file   Other Topics Concern    Not on file   Social History Narrative    Not on file     Social Determinants of Health     Financial Resource Strain: Low Risk     Difficulty of Paying Living Expenses: Not hard at all   Food Insecurity: No Food Insecurity    Worried About Running Out of Food in the Last Year: Never true    [address] in the Last Year: Never true   Transportation Needs:     Lack of Transportation (Medical): Not on file    Lack of Transportation (Non-Medical):  Not on file   Physical Activity:     Days of Exercise per Week: Not on file    Minutes of Exercise per Session: Not on file   Stress:     Feeling of Stress : Not on file   Social Connections:     Frequency of Communication with Friends and Family: Not on file    Frequency of Social Gatherings with Friends and Family: Not on file    Attends Buddhist Services: Not on file    Active Member of Clubs or Organizations: Not on file    Attends Club or Organization Meetings: Not on file    Marital Status: Not on file   Intimate Partner Violence:     Fear of Current or Ex-Partner: Not on file    Emotionally Abused: Not on file    Physically Abused: Not on file    Sexually Abused: Not on file   Housing Stability:     Unable to Pay for Housing in the Last Year: Not on file    Number of Places Lived in the Last Year: Not on file    Unstable Housing in the Last Year: Not on file        SURGICAL HISTORY  Past Surgical History:   Procedure Laterality Date    CHOLECYSTECTOMY      COLONOSCOPY N/A 1/19/2022    COLONOSCOPY POLYPECTOMY SNARE/COLD BIOPSY performed by Kristen Sebastian MD at [address] [place] [address] Left     tarsal tunnel and plantar faciitis    LEEP                   CURRENT MEDICATIONS  Current Outpatient Medications   Medication Sig Dispense Refill    propranolol (INDERAL LA) 80 MG extended release capsule Take 1 tablet by mouth daily 90 capsule 1    calcium carbonate (OSCAL) 500 MG TABS tablet Take 500 mg by mouth daily      aspirin 81 MG chewable tablet Take 81 mg by mouth daily      Cholecalciferol (VITAMIN D) 50 MCG (2000 UT) CAPS capsule Take 1 capsule by mouth daily       No current facility-administered medications for this visit. ALLERGIES  Allergies   Allergen Reactions    Neosporin Original [Bacitracin-Neomycin-Polymyxin]        PHYSICAL EXAM    /72 (Site: Right Upper Arm, Position: Sitting, Cuff Size: Medium Adult)   Pulse 71   Ht 5' 6\" (1.676 m)   Wt 178 lb 12.8 oz (81.1 kg)   LMP 02/10/2021 Comment: Negative  SpO2 96%   BMI 28.86 kg/m²     Physical Exam  Vitals and nursing note reviewed. Constitutional:       General: She is not in acute distress. Appearance: Normal appearance. She is well-developed. She is not ill-appearing or toxic-appearing. HENT:      Head: Normocephalic and atraumatic. Nose: Nose normal. No congestion. Mouth/Throat:      Mouth: Mucous membranes are moist.      Pharynx: Oropharynx is clear. Eyes:      Pupils: Pupils are equal, round, and reactive to light. Cardiovascular:      Rate and Rhythm: Normal rate and regular rhythm. Heart sounds: Normal heart sounds. No murmur heard. No gallop. Pulmonary:      Effort: Pulmonary effort is normal. No respiratory distress.       Breath sounds: Normal breath sounds. No rhonchi or rales. Abdominal:      Palpations: Abdomen is soft. Musculoskeletal:         General: No swelling or deformity. Normal range of motion. Cervical back: Normal range of motion and neck supple. No rigidity. Lymphadenopathy:      Cervical: No cervical adenopathy. Skin:     General: Skin is warm and dry. Coloration: Skin is not jaundiced. Findings: No bruising. Neurological:      General: No focal deficit present. Mental Status: She is alert and oriented to person, place, and time. Mental status is at baseline. Cranial Nerves: No cranial nerve deficit. Motor: No weakness. Psychiatric:         Mood and Affect: Mood normal.         Behavior: Behavior normal.         Thought Content: Thought content normal.         ASSESSMENT & PLAN     Diagnosis Orders   1. Palpitations  propranolol (INDERAL LA) 80 MG extended release capsule   At this time patient to continue on same regimen will provide refills as needed patient is to continue to work on increased exercise with mild weight loss. Call me with changes or problems. She will need further COVID vaccination depending on recommendations later this fall. Call with other problems or issues. Will need Shingrix shots in near future. Return in about 6 months (around 11/17/2022).          Electronically signed by Reema Rodriguez MD on 5/17/2022

## 2022-11-15 ENCOUNTER — OFFICE VISIT (OUTPATIENT)
Dept: FAMILY MEDICINE CLINIC | Age: 57
End: 2022-11-15
Payer: COMMERCIAL

## 2022-11-15 VITALS
WEIGHT: 181 LBS | SYSTOLIC BLOOD PRESSURE: 136 MMHG | BODY MASS INDEX: 29.09 KG/M2 | DIASTOLIC BLOOD PRESSURE: 78 MMHG | HEIGHT: 66 IN | HEART RATE: 83 BPM | OXYGEN SATURATION: 99 %

## 2022-11-15 DIAGNOSIS — R00.2 PALPITATIONS: ICD-10-CM

## 2022-11-15 DIAGNOSIS — R35.0 URINARY FREQUENCY: Primary | ICD-10-CM

## 2022-11-15 LAB
BILIRUBIN, POC: NORMAL
BLOOD URINE, POC: NORMAL
CLARITY, POC: NORMAL
COLOR, POC: NORMAL
GLUCOSE URINE, POC: NORMAL
KETONES, POC: NORMAL
LEUKOCYTE EST, POC: NORMAL
NITRITE, POC: NORMAL
PH, POC: 5.5
PROTEIN, POC: NORMAL
SPECIFIC GRAVITY, POC: 1.02
UROBILINOGEN, POC: 0.2

## 2022-11-15 PROCEDURE — 81002 URINALYSIS NONAUTO W/O SCOPE: CPT | Performed by: FAMILY MEDICINE

## 2022-11-15 PROCEDURE — 90674 CCIIV4 VAC NO PRSV 0.5 ML IM: CPT | Performed by: FAMILY MEDICINE

## 2022-11-15 PROCEDURE — 90471 IMMUNIZATION ADMIN: CPT | Performed by: FAMILY MEDICINE

## 2022-11-15 PROCEDURE — 99213 OFFICE O/P EST LOW 20 MIN: CPT | Performed by: FAMILY MEDICINE

## 2022-11-15 RX ORDER — PHENAZOPYRIDINE HYDROCHLORIDE 200 MG/1
200 TABLET, FILM COATED ORAL 3 TIMES DAILY PRN
Qty: 20 TABLET | Refills: 0 | Status: SHIPPED | OUTPATIENT
Start: 2022-11-15 | End: 2022-11-18

## 2022-11-15 RX ORDER — CIPROFLOXACIN 500 MG/1
500 TABLET, FILM COATED ORAL 2 TIMES DAILY
Qty: 14 TABLET | Refills: 0 | Status: SHIPPED | OUTPATIENT
Start: 2022-11-15 | End: 2022-11-22

## 2022-11-15 RX ORDER — PROPRANOLOL HYDROCHLORIDE 80 MG/1
CAPSULE, EXTENDED RELEASE ORAL
Qty: 90 CAPSULE | Refills: 1 | Status: SHIPPED | OUTPATIENT
Start: 2022-11-15

## 2022-11-15 ASSESSMENT — ENCOUNTER SYMPTOMS
CHEST TIGHTNESS: 0
ABDOMINAL PAIN: 0
WHEEZING: 0
DIARRHEA: 0
TROUBLE SWALLOWING: 0
VOMITING: 0
NAUSEA: 0
BLOOD IN STOOL: 0
EYE PAIN: 0
SHORTNESS OF BREATH: 0

## 2022-11-15 ASSESSMENT — PATIENT HEALTH QUESTIONNAIRE - PHQ9
SUM OF ALL RESPONSES TO PHQ QUESTIONS 1-9: 0
2. FEELING DOWN, DEPRESSED OR HOPELESS: 0
SUM OF ALL RESPONSES TO PHQ9 QUESTIONS 1 & 2: 0
SUM OF ALL RESPONSES TO PHQ QUESTIONS 1-9: 0
1. LITTLE INTEREST OR PLEASURE IN DOING THINGS: 0

## 2022-11-15 NOTE — PROGRESS NOTES
11/15/2022    [facility]ian[facility]    Chief Complaint   Patient presents with    6 Month Follow-Up     New job, more physical    Urinary Tract Infection     Frequency, pressure. HPI  History was obtained from the patient. Yumiko Hirsch is a 62 y.o. female who presents today with routine recheck she does have a history of palpitations well controlled by beta-blockade. She is taking a different job at work she works at [employer] is a lot more physical and she finds her self somewhat fatigued and with musculoskeletal aches and pains. In addition she is having some urinary tract infection symptoms for about 1 week. Has had chills no fever no back pain no obvious blood in her urine. Yumiko Hirsch has had her COVID shots x2 and 1 COVID booster. REVIEW OF SYMPTOMS    Review of Systems   Constitutional:  Negative for activity change and fatigue. HENT:  Negative for congestion, hearing loss, mouth sores and trouble swallowing. Eyes:  Negative for pain and visual disturbance. Respiratory:  Negative for chest tightness, shortness of breath and wheezing. Cardiovascular:  Negative for chest pain and palpitations. Gastrointestinal:  Negative for abdominal pain, blood in stool, diarrhea, nausea and vomiting. Endocrine: Negative for cold intolerance, polydipsia and polyuria. Genitourinary:  Negative for dysuria, frequency, menstrual problem and urgency. Musculoskeletal:  Negative for arthralgias, gait problem and neck stiffness. Skin:  Negative for rash. Allergic/Immunologic: Negative for environmental allergies. Neurological:  Negative for dizziness, seizures, speech difficulty and weakness. Hematological:  Does not bruise/bleed easily. Psychiatric/Behavioral:  Negative for agitation, confusion and hallucinations.       PAST MEDICAL HISTORY  Past Medical History:   Diagnosis Date    Mitral valve prolapse     Palpitations        FAMILY HISTORY  Family History   Problem Relation Age of Onset    Diabetes Paternal Grandmother     Cancer Mother     Diabetes Father        SOCIAL HISTORY  Social History     Socioeconomic History    Marital status:      Spouse name: None    Number of children: None    Years of education: None    Highest education level: None   Tobacco Use    Smoking status: Never    Smokeless tobacco: Never   Vaping Use    Vaping Use: Never used   Substance and Sexual Activity    Alcohol use: Yes     Alcohol/week: 1.0 standard drink     Types: 1 Glasses of wine per week     Comment: rare    Drug use: Never    Sexual activity: Yes     Partners: Male     Social Determinants of Health     Financial Resource Strain: Low Risk     Difficulty of Paying Living Expenses: Not hard at all   Food Insecurity: No Food Insecurity    Worried About Running Out of Food in the Last Year: Never true    Ran Out of Food in the Last Year: Never true        SURGICAL HISTORY  Past Surgical History:   Procedure Laterality Date    CHOLECYSTECTOMY      COLONOSCOPY N/A 1/19/2022    COLONOSCOPY POLYPECTOMY SNARE/COLD BIOPSY performed by Sachi Fuller MD at [address] Left     tarsal tunnel and plantar faciitis    LEEP                   CURRENT MEDICATIONS  Current Outpatient Medications   Medication Sig Dispense Refill    propranolol (INDERAL LA) 80 MG extended release capsule Take 1 tablet by mouth daily 90 capsule 1    ciprofloxacin (CIPRO) 500 MG tablet Take 1 tablet by mouth 2 times daily for 7 days 14 tablet 0    phenazopyridine (PYRIDIUM) 200 MG tablet Take 1 tablet by mouth 3 times daily as needed for Pain 20 tablet 0    calcium carbonate (OSCAL) 500 MG TABS tablet Take 500 mg by mouth daily      aspirin 81 MG chewable tablet Take 81 mg by mouth daily      Cholecalciferol (VITAMIN D) 50 MCG (2000 UT) CAPS capsule Take 1 capsule by mouth daily       No current facility-administered medications for this visit.        ALLERGIES  Allergies   Allergen Reactions    Neosporin Original [Bacitracin-Neomycin-Polymyxin]        PHYSICAL EXAM    /78 (Site: Right Upper Arm, Position: Sitting, Cuff Size: Medium Adult)   Pulse 83   Ht 5' 6\" (1.676 m)   Wt 181 lb (82.1 kg)   LMP 02/10/2021 Comment: Negative  SpO2 99%   BMI 29.21 kg/m²     Physical Exam  Vitals and nursing note reviewed. Constitutional:       General: She is not in acute distress. Appearance: Normal appearance. She is well-developed. She is not ill-appearing or toxic-appearing. HENT:      Head: Normocephalic and atraumatic. Nose: Nose normal. No congestion. Mouth/Throat:      Mouth: Mucous membranes are moist.   Eyes:      Pupils: Pupils are equal, round, and reactive to light. Cardiovascular:      Rate and Rhythm: Normal rate. Rhythm irregular. Heart sounds: Normal heart sounds. No murmur heard. No gallop. Pulmonary:      Effort: Pulmonary effort is normal. No respiratory distress. Breath sounds: Normal breath sounds. No wheezing, rhonchi or rales. Abdominal:      Palpations: Abdomen is soft. Musculoskeletal:         General: No deformity. Normal range of motion. Cervical back: Normal range of motion and neck supple. No rigidity. Lymphadenopathy:      Cervical: No cervical adenopathy. Skin:     General: Skin is warm and dry. Coloration: Skin is not jaundiced. Findings: No bruising. Neurological:      General: No focal deficit present. Mental Status: She is alert and oriented to person, place, and time. Mental status is at baseline. Cranial Nerves: No cranial nerve deficit. Motor: No weakness. Psychiatric:         Thought Content: Thought content normal.   Urinalysis in office today showed positive leukocytes. Patient appeared nontoxic but uncomfortable. ASSESSMENT & PLAN     Diagnosis Orders   1. Urinary frequency  POCT Urinalysis no Micro      2.  Palpitations  propranolol (INDERAL LA) 80 MG extended release capsule      At this point patient did get flu shot today and will consider COVID booster in near future. She is to push fluids, avoid caffeine, and we will treat her with ciprofloxacin 500 mg twice daily for 1 week and also Rx with Pyridium 200 mg 3 times daily as needed for bladder discomfort. She will call with other changes or problems. Return in about 6 months (around 5/15/2023).          Electronically signed by Alberto Gomez MD on 11/15/2022

## 2022-12-19 ENCOUNTER — OFFICE VISIT (OUTPATIENT)
Dept: FAMILY MEDICINE CLINIC | Age: 57
End: 2022-12-19
Payer: COMMERCIAL

## 2022-12-19 VITALS
TEMPERATURE: 98.1 F | SYSTOLIC BLOOD PRESSURE: 104 MMHG | OXYGEN SATURATION: 98 % | HEIGHT: 66 IN | WEIGHT: 172 LBS | DIASTOLIC BLOOD PRESSURE: 70 MMHG | HEART RATE: 85 BPM | BODY MASS INDEX: 27.64 KG/M2

## 2022-12-19 DIAGNOSIS — H10.9 CONJUNCTIVITIS OF LEFT EYE, UNSPECIFIED CONJUNCTIVITIS TYPE: Primary | ICD-10-CM

## 2022-12-19 DIAGNOSIS — R09.82 POST-NASAL DRIP: ICD-10-CM

## 2022-12-19 PROCEDURE — 99213 OFFICE O/P EST LOW 20 MIN: CPT | Performed by: NURSE PRACTITIONER

## 2022-12-19 RX ORDER — MOXIFLOXACIN 5 MG/ML
1 SOLUTION/ DROPS OPHTHALMIC 3 TIMES DAILY
Qty: 3 ML | Refills: 0 | Status: SHIPPED | OUTPATIENT
Start: 2022-12-19 | End: 2022-12-26

## 2022-12-19 ASSESSMENT — ENCOUNTER SYMPTOMS
EYE ITCHING: 0
DIARRHEA: 0
VOMITING: 0
SINUS PRESSURE: 0
SHORTNESS OF BREATH: 0
PHOTOPHOBIA: 0
NAUSEA: 0
WHEEZING: 0
SORE THROAT: 0
RHINORRHEA: 0
COUGH: 0
EYE REDNESS: 1
SINUS PAIN: 0
EYE DISCHARGE: 1
CHEST TIGHTNESS: 0
EYE PAIN: 0

## 2022-12-19 NOTE — PROGRESS NOTES
Grazyna Mares   62 y.o.  female  [id number]      Chief Complaint   Patient presents with    Conjunctivitis        Subjective:  62 y. o.female is here for a follow up. She has the following chronic/acute medical problems:  Patient Active Problem List   Diagnosis    Palpitations    Mitral valve prolapse       Patient is here with complaints of  pink eye left eye. This started yesterday morning. Patient states the eye is draining. Patient states the past couple of day has waked up with the eye matted shut. Patient states about a week or two she had a viral respiratory illness. Patient states she continues to have post nasal drip. Review of Systems   Constitutional:  Negative for appetite change, chills, fatigue and fever. HENT:  Positive for postnasal drip. Negative for congestion, ear pain, rhinorrhea, sinus pressure, sinus pain, sneezing and sore throat. Eyes:  Positive for discharge and redness. Negative for photophobia, pain, itching and visual disturbance. Respiratory:  Negative for cough, chest tightness, shortness of breath and wheezing. Cardiovascular:  Negative for chest pain and palpitations. Gastrointestinal:  Negative for diarrhea, nausea and vomiting. Skin:  Negative for rash. Neurological:  Negative for dizziness, light-headedness and headaches. Current Outpatient Medications   Medication Sig Dispense Refill    moxifloxacin (VIGAMOX) 0.5 % ophthalmic solution Place 1 drop into both eyes 3 times daily for 7 days 3 mL 0    propranolol (INDERAL LA) 80 MG extended release capsule Take 1 tablet by mouth daily 90 capsule 1    calcium carbonate (OSCAL) 500 MG TABS tablet Take 500 mg by mouth daily      aspirin 81 MG chewable tablet Take 81 mg by mouth daily      Cholecalciferol (VITAMIN D) 50 MCG (2000 UT) CAPS capsule Take 1 capsule by mouth daily       No current facility-administered medications for this visit. Past medical, family,surgical history reviewed today. Objective:  /70   Pulse 85   Temp 98.1 °F (36.7 °C) (Oral)   Ht 5' 6\" (1.676 m)   Wt 172 lb (78 kg)   LMP 02/10/2021 Comment: Negative  SpO2 98%   BMI 27.76 kg/m²   BP Readings from Last 3 Encounters:   12/19/22 104/70   11/15/22 136/78   05/17/22 138/72     Wt Readings from Last 3 Encounters:   12/19/22 172 lb (78 kg)   11/15/22 181 lb (82.1 kg)   05/17/22 178 lb 12.8 oz (81.1 kg)         Physical Exam  Constitutional:       Appearance: Normal appearance. HENT:      Head: Normocephalic. Eyes:      Comments: Sclera of left eye is red   Cardiovascular:      Rate and Rhythm: Normal rate and regular rhythm. Heart sounds: Normal heart sounds. Pulmonary:      Effort: Pulmonary effort is normal.      Breath sounds: Normal breath sounds. Musculoskeletal:      Cervical back: Neck supple. Skin:     General: Skin is warm and dry. Neurological:      Mental Status: She is alert and oriented to person, place, and time.    Psychiatric:         Mood and Affect: Mood normal.         Behavior: Behavior normal.       Lab Results   Component Value Date    WBC 4.6 11/17/2021    HGB 13.9 11/17/2021    HCT 43.0 11/17/2021    MCV 90.7 11/17/2021     11/17/2021     Lab Results   Component Value Date     11/17/2021    K 4.2 11/17/2021     11/17/2021    CO2 26 11/17/2021    BUN 16 11/17/2021    CREATININE 0.6 11/17/2021    GLUCOSE 106 (H) 11/17/2021    CALCIUM 9.7 11/17/2021    PROT 7.0 11/17/2021    LABALBU 4.4 11/17/2021    BILITOT 0.9 11/17/2021    ALKPHOS 82 11/17/2021    AST 16 11/17/2021    ALT 9 (L) 11/17/2021    LABGLOM >60 11/17/2021    GFRAA >60 11/17/2021    AGRATIO 1.7 11/17/2021     Lab Results   Component Value Date    CHOL 188 11/17/2021    CHOL 160 08/29/2018    CHOL 183 10/12/2010     Lab Results   Component Value Date    TRIG 80 11/17/2021    TRIG 66 08/29/2018    TRIG 79 10/12/2010     Lab Results   Component Value Date    HDL 67 (H) 11/17/2021    HDL 53 08/29/2018    HDL 52 (L) 10/12/2010     Lab Results   Component Value Date    LDLCALC 105 (H) 11/17/2021    LDLCALC 94 08/29/2018    LDLCALC 115 (H) 10/12/2010     No results found for: LABA1C  Lab Results   Component Value Date    [facility] 1.541 10/12/2010         ASSESSMENT/PLAN:      1. Conjunctivitis of left eye, unspecified conjunctivitis type  - moxifloxacin (VIGAMOX) 0.5 % ophthalmic solution; Place 1 drop into both eyes 3 times daily for 7 days  Dispense: 3 mL; Refill: 0    2. Post-nasal drip  Flonase 1 spray each nostril daily until the symptom resolves. There are no discontinued medications. No orders of the defined types were placed in this encounter. Care discussed with patient. Questions answered. Patient verbalizes understanding and agrees with plan. After visit summary provided. Advised to call for any problems, questions, or concerns. Return if symptoms worsen or fail to improve.                                            Signed:  YUNIEL Ahuja CNP  12/19/22  4:31 PM

## 2023-05-21 DIAGNOSIS — R00.2 PALPITATIONS: ICD-10-CM

## 2023-05-22 ENCOUNTER — OFFICE VISIT (OUTPATIENT)
Dept: FAMILY MEDICINE CLINIC | Age: 58
End: 2023-05-22
Payer: COMMERCIAL

## 2023-05-22 VITALS
OXYGEN SATURATION: 96 % | HEART RATE: 80 BPM | DIASTOLIC BLOOD PRESSURE: 72 MMHG | BODY MASS INDEX: 29.25 KG/M2 | HEIGHT: 66 IN | WEIGHT: 182 LBS | SYSTOLIC BLOOD PRESSURE: 120 MMHG

## 2023-05-22 DIAGNOSIS — R00.2 PALPITATIONS: ICD-10-CM

## 2023-05-22 PROCEDURE — 99213 OFFICE O/P EST LOW 20 MIN: CPT | Performed by: FAMILY MEDICINE

## 2023-05-22 PROCEDURE — 90677 PCV20 VACCINE IM: CPT | Performed by: FAMILY MEDICINE

## 2023-05-22 PROCEDURE — 90471 IMMUNIZATION ADMIN: CPT | Performed by: FAMILY MEDICINE

## 2023-05-22 RX ORDER — PROPRANOLOL HYDROCHLORIDE 80 MG/1
CAPSULE, EXTENDED RELEASE ORAL
Qty: 90 CAPSULE | Refills: 1 | Status: SHIPPED | OUTPATIENT
Start: 2023-05-22 | End: 2023-05-22 | Stop reason: SDUPTHER

## 2023-05-22 RX ORDER — PROPRANOLOL HYDROCHLORIDE 80 MG/1
CAPSULE, EXTENDED RELEASE ORAL
Qty: 90 CAPSULE | Refills: 1 | Status: SHIPPED | OUTPATIENT
Start: 2023-05-22

## 2023-05-22 ASSESSMENT — ENCOUNTER SYMPTOMS
CHEST TIGHTNESS: 0
SHORTNESS OF BREATH: 0
DIARRHEA: 0
NAUSEA: 0
ABDOMINAL PAIN: 0
BLOOD IN STOOL: 0
TROUBLE SWALLOWING: 0
EYE PAIN: 0
WHEEZING: 0
VOMITING: 0

## 2023-05-22 ASSESSMENT — PATIENT HEALTH QUESTIONNAIRE - PHQ9
SUM OF ALL RESPONSES TO PHQ QUESTIONS 1-9: 0
1. LITTLE INTEREST OR PLEASURE IN DOING THINGS: 0
SUM OF ALL RESPONSES TO PHQ9 QUESTIONS 1 & 2: 0
SUM OF ALL RESPONSES TO PHQ QUESTIONS 1-9: 0
2. FEELING DOWN, DEPRESSED OR HOPELESS: 0
SUM OF ALL RESPONSES TO PHQ QUESTIONS 1-9: 0
SUM OF ALL RESPONSES TO PHQ QUESTIONS 1-9: 0

## 2023-05-22 NOTE — PROGRESS NOTES
5/22/2023    Lianne Hastings    Chief Complaint   Patient presents with    6 Month Follow-Up       HPI  History was obtained from the patient. Lisa Tse is a 62 y.o. female who presents today with routine recheck. Patient's last labs were about a year and a half ago and they were good. She has a history of palpitations of mitral valve prolapse essentially asymptomatic at this time meds are tolerated. She is doing well she is now engaged. Mood remains positive and meds are tolerated. On review she will need refills and advised to see her gynecologist get repeat Pap cytology and needs a mammogram in near future. She did receive a Prevnar 20 today. Based on past findings I believe she can wait on labs till next visit. Stevenson Borrero REVIEW OF SYMPTOMS    Review of Systems   Constitutional:  Negative for activity change and fatigue. HENT:  Negative for congestion, hearing loss, mouth sores and trouble swallowing. Eyes:  Negative for pain and visual disturbance. Respiratory:  Negative for chest tightness, shortness of breath and wheezing. Cardiovascular:  Positive for palpitations. Negative for chest pain. Gastrointestinal:  Negative for abdominal pain, blood in stool, diarrhea, nausea and vomiting. Endocrine: Negative for polydipsia and polyuria. Genitourinary:  Negative for dysuria, frequency and urgency. Musculoskeletal:  Negative for arthralgias, gait problem and neck stiffness. Skin:  Negative for rash. Allergic/Immunologic: Negative for environmental allergies. Neurological:  Negative for dizziness, seizures, speech difficulty and weakness. Hematological:  Does not bruise/bleed easily. Psychiatric/Behavioral:  Negative for agitation, confusion and hallucinations.       PAST MEDICAL HISTORY  Past Medical History:   Diagnosis Date    Mitral valve prolapse     Palpitations        FAMILY HISTORY  Family History   Problem Relation Age of Onset    Diabetes Paternal Grandmother     Cancer Mother

## 2023-10-13 ENCOUNTER — HOSPITAL ENCOUNTER (OUTPATIENT)
Dept: GENERAL RADIOLOGY | Age: 58
Discharge: HOME | End: 2023-10-13
Payer: COMMERCIAL

## 2023-10-13 ENCOUNTER — HOSPITAL ENCOUNTER (OUTPATIENT)
Age: 58
Discharge: HOME | End: 2023-10-13
Payer: COMMERCIAL

## 2023-10-13 ENCOUNTER — OFFICE VISIT (OUTPATIENT)
Dept: FAMILY MEDICINE CLINIC | Age: 58
End: 2023-10-13
Payer: COMMERCIAL

## 2023-10-13 VITALS
DIASTOLIC BLOOD PRESSURE: 80 MMHG | OXYGEN SATURATION: 95 % | HEART RATE: 68 BPM | BODY MASS INDEX: 30.51 KG/M2 | SYSTOLIC BLOOD PRESSURE: 126 MMHG | WEIGHT: 189 LBS

## 2023-10-13 DIAGNOSIS — M25.561 ACUTE PAIN OF RIGHT KNEE: ICD-10-CM

## 2023-10-13 DIAGNOSIS — M25.561 ACUTE PAIN OF RIGHT KNEE: Primary | ICD-10-CM

## 2023-10-13 PROCEDURE — 99212 OFFICE O/P EST SF 10 MIN: CPT | Performed by: NURSE PRACTITIONER

## 2023-10-13 PROCEDURE — 73562 X-RAY EXAM OF KNEE 3: CPT

## 2023-10-13 SDOH — ECONOMIC STABILITY: INCOME INSECURITY: HOW HARD IS IT FOR YOU TO PAY FOR THE VERY BASICS LIKE FOOD, HOUSING, MEDICAL CARE, AND HEATING?: NOT HARD AT ALL

## 2023-10-13 SDOH — ECONOMIC STABILITY: FOOD INSECURITY: WITHIN THE PAST 12 MONTHS, THE FOOD YOU BOUGHT JUST DIDN'T LAST AND YOU DIDN'T HAVE MONEY TO GET MORE.: NEVER TRUE

## 2023-10-13 SDOH — ECONOMIC STABILITY: FOOD INSECURITY: WITHIN THE PAST 12 MONTHS, YOU WORRIED THAT YOUR FOOD WOULD RUN OUT BEFORE YOU GOT MONEY TO BUY MORE.: NEVER TRUE

## 2023-10-13 SDOH — ECONOMIC STABILITY: HOUSING INSECURITY
IN THE LAST 12 MONTHS, WAS THERE A TIME WHEN YOU DID NOT HAVE A STEADY PLACE TO SLEEP OR SLEPT IN A SHELTER (INCLUDING NOW)?: NO

## 2023-10-13 ASSESSMENT — ENCOUNTER SYMPTOMS: COLOR CHANGE: 0

## 2023-10-13 NOTE — PROGRESS NOTES
any problems, questions, or concerns. Return if symptoms worsen or fail to improve. An electronic signature was used to authenticate this note.     --YUNIEL Barahona - CNP

## 2023-10-13 NOTE — PATIENT INSTRUCTIONS
Rest and protect your knee. Take a break from any activity that may cause pain. Put ice or a cold pack on your knee for 10 to 20 minutes at a time. Put a thin cloth between the ice and your skin. Prop up a sore knee on a pillow when you ice it or anytime you sit or lie down for the next 3 days. Try to keep it above the level of your heart. This will help reduce swelling. If your knee is not swollen, you can put moist heat, a heating pad, or a warm cloth on your knee.

## 2023-10-26 ENCOUNTER — OFFICE VISIT (OUTPATIENT)
Dept: ORTHOPEDIC SURGERY | Age: 58
End: 2023-10-26
Payer: COMMERCIAL

## 2023-10-26 VITALS
HEART RATE: 88 BPM | HEIGHT: 66 IN | OXYGEN SATURATION: 97 % | WEIGHT: 190 LBS | BODY MASS INDEX: 30.53 KG/M2 | SYSTOLIC BLOOD PRESSURE: 136 MMHG | DIASTOLIC BLOOD PRESSURE: 78 MMHG | RESPIRATION RATE: 14 BRPM

## 2023-10-26 DIAGNOSIS — S83.91XA SPRAIN OF RIGHT KNEE, UNSPECIFIED LIGAMENT, INITIAL ENCOUNTER: Primary | ICD-10-CM

## 2023-10-26 PROCEDURE — 99203 OFFICE O/P NEW LOW 30 MIN: CPT

## 2023-10-26 RX ORDER — FLUCONAZOLE 150 MG/1
TABLET ORAL
COMMUNITY
Start: 2023-10-16

## 2023-10-26 RX ORDER — KETOCONAZOLE 20 MG/ML
SHAMPOO TOPICAL
COMMUNITY
Start: 2023-10-18

## 2023-10-26 RX ORDER — TRIAMCINOLONE ACETONIDE 1 MG/G
CREAM TOPICAL
COMMUNITY
Start: 2023-10-16

## 2023-10-26 RX ORDER — BETAMETHASONE DIPROPIONATE 0.5 MG/G
LOTION TOPICAL
COMMUNITY
Start: 2023-10-16

## 2023-10-26 ASSESSMENT — ENCOUNTER SYMPTOMS
FACIAL SWELLING: 0
SHORTNESS OF BREATH: 0
RHINORRHEA: 0
BACK PAIN: 0
COUGH: 0
NAUSEA: 0

## 2023-10-31 ENCOUNTER — HOSPITAL ENCOUNTER (OUTPATIENT)
Dept: MRI IMAGING | Age: 58
Discharge: HOME | End: 2023-10-31
Payer: COMMERCIAL

## 2023-10-31 DIAGNOSIS — S83.91XA SPRAIN OF RIGHT KNEE, UNSPECIFIED LIGAMENT, INITIAL ENCOUNTER: ICD-10-CM

## 2023-10-31 PROCEDURE — 73721 MRI JNT OF LWR EXTRE W/O DYE: CPT

## 2023-11-21 ENCOUNTER — OFFICE VISIT (OUTPATIENT)
Dept: FAMILY MEDICINE CLINIC | Age: 58
End: 2023-11-21
Payer: COMMERCIAL

## 2023-11-21 VITALS
OXYGEN SATURATION: 96 % | HEART RATE: 78 BPM | BODY MASS INDEX: 30.33 KG/M2 | WEIGHT: 188.7 LBS | DIASTOLIC BLOOD PRESSURE: 80 MMHG | RESPIRATION RATE: 14 BRPM | HEIGHT: 66 IN | SYSTOLIC BLOOD PRESSURE: 130 MMHG

## 2023-11-21 DIAGNOSIS — R00.2 PALPITATIONS: Primary | ICD-10-CM

## 2023-11-21 DIAGNOSIS — R03.0 BORDERLINE SYSTOLIC HTN: ICD-10-CM

## 2023-11-21 DIAGNOSIS — J06.9 VIRAL URI: ICD-10-CM

## 2023-11-21 DIAGNOSIS — S89.91XS KNEE INJURY, RIGHT, SEQUELA: ICD-10-CM

## 2023-11-21 PROCEDURE — 99213 OFFICE O/P EST LOW 20 MIN: CPT | Performed by: FAMILY MEDICINE

## 2023-11-21 RX ORDER — PROPRANOLOL HYDROCHLORIDE 80 MG/1
CAPSULE, EXTENDED RELEASE ORAL
Qty: 90 CAPSULE | Refills: 1 | Status: SHIPPED | OUTPATIENT
Start: 2023-11-21

## 2023-11-21 ASSESSMENT — ENCOUNTER SYMPTOMS
BLOOD IN STOOL: 0
EYE PAIN: 0
ABDOMINAL PAIN: 0
NAUSEA: 0
TROUBLE SWALLOWING: 0
SHORTNESS OF BREATH: 0
DIARRHEA: 0
VOMITING: 0
CHEST TIGHTNESS: 0
WHEEZING: 0

## 2023-11-25 DIAGNOSIS — R00.2 PALPITATIONS: ICD-10-CM

## 2023-11-27 RX ORDER — PROPRANOLOL HYDROCHLORIDE 80 MG/1
CAPSULE, EXTENDED RELEASE ORAL
Qty: 90 CAPSULE | Refills: 1 | OUTPATIENT
Start: 2023-11-27

## 2024-06-02 DIAGNOSIS — R00.2 PALPITATIONS: ICD-10-CM

## 2024-06-04 RX ORDER — PROPRANOLOL HYDROCHLORIDE 80 MG/1
CAPSULE, EXTENDED RELEASE ORAL
Qty: 90 CAPSULE | Refills: 0 | Status: SHIPPED | OUTPATIENT
Start: 2024-06-04 | End: 2024-06-05

## 2024-06-05 RX ORDER — PROPRANOLOL HYDROCHLORIDE 80 MG/1
CAPSULE, EXTENDED RELEASE ORAL
Qty: 90 CAPSULE | Refills: 0 | Status: SHIPPED | OUTPATIENT
Start: 2024-06-05

## 2024-06-17 ENCOUNTER — OFFICE VISIT (OUTPATIENT)
Dept: FAMILY MEDICINE CLINIC | Age: 59
End: 2024-06-17
Payer: COMMERCIAL

## 2024-06-17 VITALS
BODY MASS INDEX: 31.02 KG/M2 | HEART RATE: 82 BPM | WEIGHT: 193 LBS | OXYGEN SATURATION: 98 % | SYSTOLIC BLOOD PRESSURE: 136 MMHG | HEIGHT: 66 IN | DIASTOLIC BLOOD PRESSURE: 84 MMHG

## 2024-06-17 DIAGNOSIS — R00.2 PALPITATIONS: ICD-10-CM

## 2024-06-17 DIAGNOSIS — R03.0 BORDERLINE SYSTOLIC HTN: ICD-10-CM

## 2024-06-17 DIAGNOSIS — R20.2 LEG PARESTHESIA: ICD-10-CM

## 2024-06-17 DIAGNOSIS — R03.0 BORDERLINE SYSTOLIC HTN: Primary | ICD-10-CM

## 2024-06-17 DIAGNOSIS — Z13.220 LIPID SCREENING: ICD-10-CM

## 2024-06-17 DIAGNOSIS — I34.1 MITRAL VALVE PROLAPSE: ICD-10-CM

## 2024-06-17 LAB
DEPRECATED RDW RBC AUTO: 13.7 % (ref 12.4–15.4)
HCT VFR BLD AUTO: 42.1 % (ref 36–48)
HGB BLD-MCNC: 13.9 G/DL (ref 12–16)
MCH RBC QN AUTO: 29.7 PG (ref 26–34)
MCHC RBC AUTO-ENTMCNC: 33 G/DL (ref 31–36)
MCV RBC AUTO: 90 FL (ref 80–100)
PLATELET # BLD AUTO: 257 K/UL (ref 135–450)
PMV BLD AUTO: 7.7 FL (ref 5–10.5)
RBC # BLD AUTO: 4.68 M/UL (ref 4–5.2)
WBC # BLD AUTO: 7.4 K/UL (ref 4–11)

## 2024-06-17 PROCEDURE — 1036F TOBACCO NON-USER: CPT | Performed by: FAMILY MEDICINE

## 2024-06-17 PROCEDURE — G8417 CALC BMI ABV UP PARAM F/U: HCPCS | Performed by: FAMILY MEDICINE

## 2024-06-17 PROCEDURE — 3017F COLORECTAL CA SCREEN DOC REV: CPT | Performed by: FAMILY MEDICINE

## 2024-06-17 PROCEDURE — G8427 DOCREV CUR MEDS BY ELIG CLIN: HCPCS | Performed by: FAMILY MEDICINE

## 2024-06-17 PROCEDURE — 99213 OFFICE O/P EST LOW 20 MIN: CPT | Performed by: FAMILY MEDICINE

## 2024-06-17 RX ORDER — PROPRANOLOL HYDROCHLORIDE 80 MG/1
CAPSULE, EXTENDED RELEASE ORAL
Qty: 90 CAPSULE | Refills: 1 | Status: SHIPPED | OUTPATIENT
Start: 2024-06-17

## 2024-06-17 ASSESSMENT — ENCOUNTER SYMPTOMS
VOMITING: 0
BACK PAIN: 0
ABDOMINAL PAIN: 0
EYE PAIN: 0
WHEEZING: 0
NAUSEA: 0
BLOOD IN STOOL: 0
SHORTNESS OF BREATH: 0
TROUBLE SWALLOWING: 0
CHEST TIGHTNESS: 0
DIARRHEA: 0

## 2024-06-17 ASSESSMENT — PATIENT HEALTH QUESTIONNAIRE - PHQ9
SUM OF ALL RESPONSES TO PHQ9 QUESTIONS 1 & 2: 0
1. LITTLE INTEREST OR PLEASURE IN DOING THINGS: NOT AT ALL
1. LITTLE INTEREST OR PLEASURE IN DOING THINGS: NOT AT ALL
2. FEELING DOWN, DEPRESSED OR HOPELESS: NOT AT ALL
SUM OF ALL RESPONSES TO PHQ QUESTIONS 1-9: 0
SUM OF ALL RESPONSES TO PHQ QUESTIONS 1-9: 0
SUM OF ALL RESPONSES TO PHQ9 QUESTIONS 1 & 2: 0
SUM OF ALL RESPONSES TO PHQ QUESTIONS 1-9: 0
SUM OF ALL RESPONSES TO PHQ QUESTIONS 1-9: 0
2. FEELING DOWN, DEPRESSED OR HOPELESS: NOT AT ALL

## 2024-06-17 NOTE — PROGRESS NOTES
6/17/2024    Lianne Hastings    Chief Complaint   Patient presents with    6 Month Follow-Up    Hand Problem     Left hand, swollen x2 weeks. Hurt last week, doing ok this week.        HPI  History was obtained from the patient.  Lianne is a 58 y.o. female who presents today with routine recheck.  She has a history of palpitations, possible mitral valve prolapse, and borderline hypotension.  Continues to stay physically active.  Has noted a little bit of swelling in the dorsum of the left hand for about 2 weeks no history of sting or injury.  Little bit discolored at this time.  Not tender to touch.  Slightly tender last week.  Also complaining of bilateral leg paresthesia intermittently for about 2 months noted below the knee bilaterally specially progresses.  No real restless leg symptoms.  On review she was reminded to get flu and COVID shot in the near future and shingles shot.  Check CBC, CMP lipid panel and free T4, follow-up for results refills to be provided all    REVIEW OF SYMPTOMS    Review of Systems   Constitutional:  Negative for activity change and fatigue.   HENT:  Negative for congestion, hearing loss, mouth sores and trouble swallowing.    Eyes:  Negative for pain and visual disturbance.   Respiratory:  Negative for chest tightness, shortness of breath and wheezing.    Cardiovascular:  Negative for chest pain and palpitations.        Patient with history of hypertension and mitral valve prolapse as well as possible palpitations on beta-blockade.   Gastrointestinal:  Negative for abdominal pain, blood in stool, diarrhea, nausea and vomiting.   Endocrine: Negative for polydipsia and polyuria.   Genitourinary:  Negative for dysuria, frequency and urgency.   Musculoskeletal:  Negative for arthralgias, back pain, gait problem and neck stiffness.   Skin:  Negative for rash.   Allergic/Immunologic: Negative for environmental allergies.   Neurological:  Positive for numbness. Negative for dizziness, seizures,

## 2024-06-18 LAB
ALBUMIN SERPL-MCNC: 4.6 G/DL (ref 3.4–5)
ALBUMIN/GLOB SERPL: 1.6 {RATIO} (ref 1.1–2.2)
ALP SERPL-CCNC: 84 U/L (ref 40–129)
ALT SERPL-CCNC: 22 U/L (ref 10–40)
ANION GAP SERPL CALCULATED.3IONS-SCNC: 12 MMOL/L (ref 3–16)
AST SERPL-CCNC: 38 U/L (ref 15–37)
BILIRUB SERPL-MCNC: 1 MG/DL (ref 0–1)
BUN SERPL-MCNC: 10 MG/DL (ref 7–20)
CALCIUM SERPL-MCNC: 9.8 MG/DL (ref 8.3–10.6)
CHLORIDE SERPL-SCNC: 103 MMOL/L (ref 99–110)
CHOLEST SERPL-MCNC: 188 MG/DL (ref 0–199)
CO2 SERPL-SCNC: 27 MMOL/L (ref 21–32)
CREAT SERPL-MCNC: 0.6 MG/DL (ref 0.6–1.1)
GFR SERPLBLD CREATININE-BSD FMLA CKD-EPI: >90 ML/MIN/{1.73_M2}
GLUCOSE SERPL-MCNC: 126 MG/DL (ref 70–99)
HDLC SERPL-MCNC: 47 MG/DL (ref 40–60)
LDLC SERPL CALC-MCNC: 92 MG/DL
POTASSIUM SERPL-SCNC: 3.9 MMOL/L (ref 3.5–5.1)
PROT SERPL-MCNC: 7.4 G/DL (ref 6.4–8.2)
SODIUM SERPL-SCNC: 142 MMOL/L (ref 136–145)
TRIGL SERPL-MCNC: 245 MG/DL (ref 0–150)
TSH SERPL DL<=0.005 MIU/L-ACNC: 1.42 UIU/ML (ref 0.27–4.2)
VLDLC SERPL CALC-MCNC: 49 MG/DL

## 2024-12-09 SDOH — ECONOMIC STABILITY: INCOME INSECURITY: HOW HARD IS IT FOR YOU TO PAY FOR THE VERY BASICS LIKE FOOD, HOUSING, MEDICAL CARE, AND HEATING?: NOT HARD AT ALL

## 2024-12-09 SDOH — ECONOMIC STABILITY: TRANSPORTATION INSECURITY
IN THE PAST 12 MONTHS, HAS LACK OF TRANSPORTATION KEPT YOU FROM MEETINGS, WORK, OR FROM GETTING THINGS NEEDED FOR DAILY LIVING?: NO

## 2024-12-09 SDOH — ECONOMIC STABILITY: FOOD INSECURITY: WITHIN THE PAST 12 MONTHS, THE FOOD YOU BOUGHT JUST DIDN'T LAST AND YOU DIDN'T HAVE MONEY TO GET MORE.: NEVER TRUE

## 2024-12-09 SDOH — ECONOMIC STABILITY: FOOD INSECURITY: WITHIN THE PAST 12 MONTHS, YOU WORRIED THAT YOUR FOOD WOULD RUN OUT BEFORE YOU GOT MONEY TO BUY MORE.: NEVER TRUE

## 2024-12-11 ENCOUNTER — OFFICE VISIT (OUTPATIENT)
Dept: FAMILY MEDICINE CLINIC | Age: 59
End: 2024-12-11

## 2024-12-11 VITALS
OXYGEN SATURATION: 96 % | BODY MASS INDEX: 32.09 KG/M2 | HEIGHT: 66 IN | DIASTOLIC BLOOD PRESSURE: 78 MMHG | RESPIRATION RATE: 16 BRPM | SYSTOLIC BLOOD PRESSURE: 122 MMHG | HEART RATE: 74 BPM | WEIGHT: 199.7 LBS

## 2024-12-11 DIAGNOSIS — R03.0 BORDERLINE SYSTOLIC HTN: ICD-10-CM

## 2024-12-11 DIAGNOSIS — I34.1 MITRAL VALVE PROLAPSE: Primary | ICD-10-CM

## 2024-12-11 DIAGNOSIS — R00.2 PALPITATIONS: ICD-10-CM

## 2024-12-11 RX ORDER — PROPRANOLOL HYDROCHLORIDE 80 MG/1
CAPSULE, EXTENDED RELEASE ORAL
Qty: 90 CAPSULE | Refills: 1 | Status: SHIPPED | OUTPATIENT
Start: 2024-12-11

## 2024-12-11 ASSESSMENT — ENCOUNTER SYMPTOMS
VOMITING: 0
TROUBLE SWALLOWING: 0
EYE PAIN: 0
WHEEZING: 0
DIARRHEA: 0
SHORTNESS OF BREATH: 0
CHEST TIGHTNESS: 0
BLOOD IN STOOL: 0
NAUSEA: 0
ABDOMINAL PAIN: 0

## 2024-12-11 NOTE — PROGRESS NOTES
Low dose flu vaccine given in left deltoid and patient tolerated well.   
Diagnosis Orders   1. Mitral valve prolapse        2. Palpitations  propranolol (INDERAL LA) 80 MG extended release capsule      3. Borderline systolic HTN        She is to continue to work on low-fat/low-salt diet and mild weight loss.  Watch carbs and cut back on portion sizes.  She did receive regular strength flu shot today -med list reviewed and refills given.  She will consider COVID shot in near future.  Call with changes or issues.  Does need mammogram in near future also.    Return in about 6 months (around 6/11/2025).         Electronically signed by TAMARA HERBERT MD on 12/11/2024

## 2025-02-27 DIAGNOSIS — R00.2 PALPITATIONS: ICD-10-CM

## 2025-02-28 RX ORDER — PROPRANOLOL HYDROCHLORIDE 80 MG/1
CAPSULE, EXTENDED RELEASE ORAL
Qty: 90 CAPSULE | Refills: 1 | Status: SHIPPED | OUTPATIENT
Start: 2025-02-28

## 2025-04-23 ENCOUNTER — COMMUNITY OUTREACH (OUTPATIENT)
Dept: FAMILY MEDICINE CLINIC | Age: 60
End: 2025-04-23

## 2025-06-17 DIAGNOSIS — R00.2 PALPITATIONS: ICD-10-CM

## 2025-06-20 RX ORDER — PROPRANOLOL HYDROCHLORIDE 80 MG/1
CAPSULE, EXTENDED RELEASE ORAL
Qty: 90 CAPSULE | Refills: 1 | Status: SHIPPED | OUTPATIENT
Start: 2025-06-20

## 2025-06-22 SDOH — ECONOMIC STABILITY: INCOME INSECURITY: IN THE LAST 12 MONTHS, WAS THERE A TIME WHEN YOU WERE NOT ABLE TO PAY THE MORTGAGE OR RENT ON TIME?: NO

## 2025-06-22 SDOH — ECONOMIC STABILITY: FOOD INSECURITY: WITHIN THE PAST 12 MONTHS, THE FOOD YOU BOUGHT JUST DIDN'T LAST AND YOU DIDN'T HAVE MONEY TO GET MORE.: NEVER TRUE

## 2025-06-22 SDOH — ECONOMIC STABILITY: FOOD INSECURITY: WITHIN THE PAST 12 MONTHS, YOU WORRIED THAT YOUR FOOD WOULD RUN OUT BEFORE YOU GOT MONEY TO BUY MORE.: NEVER TRUE

## 2025-06-22 SDOH — ECONOMIC STABILITY: TRANSPORTATION INSECURITY
IN THE PAST 12 MONTHS, HAS THE LACK OF TRANSPORTATION KEPT YOU FROM MEDICAL APPOINTMENTS OR FROM GETTING MEDICATIONS?: NO

## 2025-06-22 ASSESSMENT — PATIENT HEALTH QUESTIONNAIRE - PHQ9
SUM OF ALL RESPONSES TO PHQ QUESTIONS 1-9: 0
SUM OF ALL RESPONSES TO PHQ QUESTIONS 1-9: 0
2. FEELING DOWN, DEPRESSED OR HOPELESS: NOT AT ALL
SUM OF ALL RESPONSES TO PHQ QUESTIONS 1-9: 0
1. LITTLE INTEREST OR PLEASURE IN DOING THINGS: NOT AT ALL
2. FEELING DOWN, DEPRESSED OR HOPELESS: NOT AT ALL
1. LITTLE INTEREST OR PLEASURE IN DOING THINGS: NOT AT ALL
SUM OF ALL RESPONSES TO PHQ QUESTIONS 1-9: 0
SUM OF ALL RESPONSES TO PHQ9 QUESTIONS 1 & 2: 0

## 2025-06-23 ENCOUNTER — TRANSCRIBE ORDERS (OUTPATIENT)
Dept: ADMINISTRATIVE | Age: 60
End: 2025-06-23

## 2025-06-23 ENCOUNTER — OFFICE VISIT (OUTPATIENT)
Dept: FAMILY MEDICINE CLINIC | Age: 60
End: 2025-06-23
Payer: COMMERCIAL

## 2025-06-23 VITALS
RESPIRATION RATE: 16 BRPM | OXYGEN SATURATION: 97 % | BODY MASS INDEX: 31.63 KG/M2 | DIASTOLIC BLOOD PRESSURE: 78 MMHG | SYSTOLIC BLOOD PRESSURE: 130 MMHG | HEIGHT: 66 IN | HEART RATE: 78 BPM | WEIGHT: 196.8 LBS

## 2025-06-23 DIAGNOSIS — Z78.0 MENOPAUSE: ICD-10-CM

## 2025-06-23 DIAGNOSIS — Z13.31 NEGATIVE DEPRESSION SCREENING: ICD-10-CM

## 2025-06-23 DIAGNOSIS — R00.2 PALPITATIONS: Primary | ICD-10-CM

## 2025-06-23 DIAGNOSIS — E78.1 PURE HYPERTRIGLYCERIDEMIA: ICD-10-CM

## 2025-06-23 DIAGNOSIS — R73.01 IMPAIRED FASTING GLUCOSE: ICD-10-CM

## 2025-06-23 DIAGNOSIS — Z12.31 ENCOUNTER FOR SCREENING MAMMOGRAM FOR MALIGNANT NEOPLASM OF BREAST: Primary | ICD-10-CM

## 2025-06-23 PROCEDURE — G8427 DOCREV CUR MEDS BY ELIG CLIN: HCPCS | Performed by: STUDENT IN AN ORGANIZED HEALTH CARE EDUCATION/TRAINING PROGRAM

## 2025-06-23 PROCEDURE — G8417 CALC BMI ABV UP PARAM F/U: HCPCS | Performed by: STUDENT IN AN ORGANIZED HEALTH CARE EDUCATION/TRAINING PROGRAM

## 2025-06-23 PROCEDURE — 99214 OFFICE O/P EST MOD 30 MIN: CPT | Performed by: STUDENT IN AN ORGANIZED HEALTH CARE EDUCATION/TRAINING PROGRAM

## 2025-06-23 PROCEDURE — 96127 BRIEF EMOTIONAL/BEHAV ASSMT: CPT | Performed by: STUDENT IN AN ORGANIZED HEALTH CARE EDUCATION/TRAINING PROGRAM

## 2025-06-23 PROCEDURE — 1036F TOBACCO NON-USER: CPT | Performed by: STUDENT IN AN ORGANIZED HEALTH CARE EDUCATION/TRAINING PROGRAM

## 2025-06-23 PROCEDURE — 3017F COLORECTAL CA SCREEN DOC REV: CPT | Performed by: STUDENT IN AN ORGANIZED HEALTH CARE EDUCATION/TRAINING PROGRAM

## 2025-06-23 RX ORDER — PROPRANOLOL HYDROCHLORIDE 80 MG/1
CAPSULE, EXTENDED RELEASE ORAL
Qty: 90 CAPSULE | Refills: 1 | Status: SHIPPED | OUTPATIENT
Start: 2025-06-23

## 2025-06-23 ASSESSMENT — ENCOUNTER SYMPTOMS
RHINORRHEA: 0
WHEEZING: 0
SORE THROAT: 0
SHORTNESS OF BREATH: 0
COUGH: 0
DIARRHEA: 0
CONSTIPATION: 0

## 2025-06-23 NOTE — ASSESSMENT & PLAN NOTE
Changes today= none --will determine if medication indicated after labs result  Meds: None--lifestyle changes  Last lipid panel = today  Recommend lifestyle modifications such as weight loss, daily exercise for a total of at least 120min/wk, and Mediterranean diet.

## 2025-06-23 NOTE — PROGRESS NOTES
Skin:     General: Skin is warm.      Findings: No lesion or rash.   Neurological:      General: No focal deficit present.      Mental Status: She is alert and oriented to person, place, and time. Mental status is at baseline.   Psychiatric:         Mood and Affect: Mood normal.         Behavior: Behavior normal.         Thought Content: Thought content normal.         Judgment: Judgment normal.         Assessment & Plan     Problem List Items Addressed This Visit       Palpitations - Primary     -labs ordered  -stable and well controlled  -continue current medications: propranolol           Relevant Medications    propranolol (INDERAL LA) 80 MG extended release capsule    Other Relevant Orders    CBC with Auto Differential    Comprehensive Metabolic Panel w/ Reflex to MG    TSH reflex to FT4    Pure hypertriglyceridemia    Changes today= none --will determine if medication indicated after labs result  Meds: None--lifestyle changes  Last lipid panel = today  Recommend lifestyle modifications such as weight loss, daily exercise for a total of at least 120min/wk, and Mediterranean diet.            Relevant Medications    propranolol (INDERAL LA) 80 MG extended release capsule    Other Relevant Orders    CBC with Auto Differential    Comprehensive Metabolic Panel w/ Reflex to MG    Lipid Panel    Impaired fasting glucose     -checking A1c         Relevant Orders    Hemoglobin A1C     Other Visit Diagnoses         Negative depression screening                 PHQ-2= 0 (indicates no depression at this time)

## 2025-07-07 ENCOUNTER — OFFICE VISIT (OUTPATIENT)
Dept: FAMILY MEDICINE CLINIC | Age: 60
End: 2025-07-07
Payer: COMMERCIAL

## 2025-07-07 VITALS
BODY MASS INDEX: 31.68 KG/M2 | SYSTOLIC BLOOD PRESSURE: 128 MMHG | HEART RATE: 78 BPM | WEIGHT: 196.3 LBS | OXYGEN SATURATION: 99 % | DIASTOLIC BLOOD PRESSURE: 72 MMHG

## 2025-07-07 DIAGNOSIS — M79.662 PAIN AND SWELLING OF LEFT LOWER LEG: ICD-10-CM

## 2025-07-07 DIAGNOSIS — M54.50 LEFT LUMBAR PAIN: ICD-10-CM

## 2025-07-07 DIAGNOSIS — M62.830 SPASM OF RIGHT TRAPEZIUS MUSCLE: ICD-10-CM
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
## 2025-07-07 DIAGNOSIS — M79.89 PAIN AND SWELLING OF LEFT LOWER LEG: ICD-10-CM

## 2025-07-07 PROCEDURE — 3017F COLORECTAL CA SCREEN DOC REV: CPT | Performed by: PHYSICIAN ASSISTANT

## 2025-07-07 PROCEDURE — 1036F TOBACCO NON-USER: CPT | Performed by: PHYSICIAN ASSISTANT

## 2025-07-07 PROCEDURE — G8417 CALC BMI ABV UP PARAM F/U: HCPCS | Performed by: PHYSICIAN ASSISTANT

## 2025-07-07 PROCEDURE — 99214 OFFICE O/P EST MOD 30 MIN: CPT | Performed by: PHYSICIAN ASSISTANT

## 2025-07-07 PROCEDURE — G8427 DOCREV CUR MEDS BY ELIG CLIN: HCPCS | Performed by: PHYSICIAN ASSISTANT

## 2025-07-07 NOTE — PROGRESS NOTES
Comment: Negative  SpO2 99%   BMI 31.68 kg/m²     Constitutional:  Well developed, well nourished.  No acute distress.  HENT:  Normocephalic, atraumatic  Eyes:  PER, EOMI, conjunctiva normal, no discharge, no scleral icterus  Neck:  Mild tenderness and tightness to palpation right trap.  No midline pain  Cardiovascular:  Normal heart rate, normal rhythm, no murmurs, gallops or rubs  Thorax & Lungs:  Normal breath sounds, no respiratory distress, no wheezing, no rales, no rhonchi  Skin:  Warm, dry, no erythema, no rash, no cyanosis  Extremities: Mild tenderness to palpation lateral aspect of left knee and diffusely across the left lower leg.  Patient complains of edema but I do not appreciate any on examination.  She has chronic varicosities without other concerning palpable cords.  Distal pulses intact.  Musculoskeletal: Normal range of motion left knee.  No appreciable joint laxity  Back:  Mild tenderness to palpation left lumbar paraspinous region.  No midline pain.  Neurologic:  Alert & oriented  Psychiatric:  Affect normal, mood normal    ASSESSMENT & PLAN    Lianne was seen today for leg swelling and fall.    Diagnoses and all orders for this visit:    Fall in home, initial encounter    Pain and swelling of left lower leg  -     Vascular duplex lower extremity venous left; Future  -     tiZANidine (ZANAFLEX) 4 MG tablet; Take 1 tablet by mouth 3 times daily as needed (Muscle pain/spasms) Caution drowsiness    Spasm of right trapezius muscle    Left lumbar pain       Rest as often as practical  Continue ibuprofen up to 800 mg every 8 hours as needed  Patient politely declined steroids as they make chronic palpitations worse  Trial of tizanidine up to 3 times daily as needed.  Caution drowsiness  Alternate with ice and heat periodically throughout the day  Obtain ultrasound left lower extremity to rule out DVT  ER for any changes     There are no discontinued medications.     No follow-ups on file.     Plan of

## 2025-07-11 ENCOUNTER — TELEPHONE (OUTPATIENT)
Dept: FAMILY MEDICINE CLINIC | Age: 60
End: 2025-07-11

## 2025-07-11 ENCOUNTER — HOSPITAL ENCOUNTER (OUTPATIENT)
Dept: ULTRASOUND IMAGING | Age: 60
Discharge: HOME | End: 2025-07-11
Payer: COMMERCIAL

## 2025-07-11 DIAGNOSIS — M79.662 PAIN AND SWELLING OF LEFT LOWER LEG: ICD-10-CM

## 2025-07-11 DIAGNOSIS — M79.89 PAIN AND SWELLING OF LEFT LOWER LEG: ICD-10-CM

## 2025-07-11 PROCEDURE — 93971 EXTREMITY STUDY: CPT

## 2025-07-15 DIAGNOSIS — M79.89 PAIN AND SWELLING OF LEFT LOWER LEG: ICD-10-CM

## 2025-07-15 DIAGNOSIS — M79.662 PAIN AND SWELLING OF LEFT LOWER LEG: ICD-10-CM

## 2025-07-25 DIAGNOSIS — Z12.31 ENCOUNTER FOR SCREENING MAMMOGRAM FOR MALIGNANT NEOPLASM OF BREAST: Primary | ICD-10-CM

## 2025-07-25 DIAGNOSIS — Z13.820 SCREENING FOR OSTEOPOROSIS: ICD-10-CM

## 2025-07-28 ENCOUNTER — HOSPITAL ENCOUNTER (OUTPATIENT)
Dept: WOMENS IMAGING | Age: 60
Discharge: HOME | End: 2025-07-28
Attending: OBSTETRICS & GYNECOLOGY
Payer: COMMERCIAL

## 2025-07-28 DIAGNOSIS — Z12.31 ENCOUNTER FOR SCREENING MAMMOGRAM FOR MALIGNANT NEOPLASM OF BREAST: ICD-10-CM

## 2025-07-28 DIAGNOSIS — Z13.820 SCREENING FOR OSTEOPOROSIS: ICD-10-CM

## 2025-07-28 PROCEDURE — 77080 DXA BONE DENSITY AXIAL: CPT

## 2025-07-28 PROCEDURE — 77063 BREAST TOMOSYNTHESIS BI: CPT

## (undated) DEVICE — ENDOSCOPY KIT: Brand: MEDLINE INDUSTRIES, INC.

## (undated) DEVICE — FORCEPS BX JUMBO L240CM DIA2.8MM WRK CHN 3.2MM ORNG W/ NDL

## (undated) DEVICE — Z DISCONTINUED NO SUB IDED TUBING ETCO2 AD L6.5FT NSL ORAL CVD PRNG NONFLARED TIP OVR